# Patient Record
Sex: FEMALE | Race: WHITE | NOT HISPANIC OR LATINO | Employment: PART TIME | URBAN - METROPOLITAN AREA
[De-identification: names, ages, dates, MRNs, and addresses within clinical notes are randomized per-mention and may not be internally consistent; named-entity substitution may affect disease eponyms.]

---

## 2019-05-14 ENCOUNTER — TELEPHONE (OUTPATIENT)
Dept: UROGYNECOLOGY | Facility: CLINIC | Age: 69
End: 2019-05-14

## 2019-05-14 NOTE — TELEPHONE ENCOUNTER
Called and informed that DR Wray is no longer doing WWE , he is strictly prolapse and bladder problems, states understanding and call was ended.

## 2019-05-14 NOTE — TELEPHONE ENCOUNTER
----- Message from Elaine Argueta sent at 5/14/2019  2:03 PM CDT -----  Contact: patient  Type: Needs Medical Advice    Who Called:  patient  Symptoms (please be specific):  A rash  Best Call Back Number: [phone]  Additional Information: calling to schedule a new patient appointment. I was unable to do it. Please give call back

## 2019-07-03 ENCOUNTER — TELEPHONE (OUTPATIENT)
Dept: FAMILY MEDICINE | Facility: CLINIC | Age: 69
End: 2019-07-03

## 2019-07-30 DIAGNOSIS — Z12.39 SCREENING BREAST EXAMINATION: Primary | ICD-10-CM

## 2019-08-06 ENCOUNTER — HOSPITAL ENCOUNTER (OUTPATIENT)
Dept: RADIOLOGY | Facility: HOSPITAL | Age: 69
Discharge: HOME | End: 2019-08-06
Attending: NURSE PRACTITIONER
Payer: MEDICARE

## 2019-08-06 VITALS — WEIGHT: 170 LBS | HEIGHT: 65 IN | BODY MASS INDEX: 28.32 KG/M2

## 2019-08-06 DIAGNOSIS — Z12.39 SCREENING BREAST EXAMINATION: ICD-10-CM

## 2019-08-06 PROCEDURE — 77067 SCR MAMMO BI INCL CAD: CPT | Mod: TC

## 2020-08-18 ENCOUNTER — HOSPITAL ENCOUNTER (OUTPATIENT)
Dept: RADIOLOGY | Facility: HOSPITAL | Age: 70
Discharge: HOME | End: 2020-08-18
Attending: INTERNAL MEDICINE
Payer: MEDICARE

## 2020-08-18 DIAGNOSIS — R06.02 SHORTNESS OF BREATH: ICD-10-CM

## 2020-08-18 DIAGNOSIS — R06.02 SHORTNESS OF BREATH: Primary | ICD-10-CM

## 2020-08-18 PROCEDURE — 71046 X-RAY EXAM CHEST 2 VIEWS: CPT | Mod: TC,PO

## 2021-11-16 ENCOUNTER — DOCUMENTATION ONLY (OUTPATIENT)
Dept: PULMONOLOGY | Facility: CLINIC | Age: 71
End: 2021-11-16
Payer: MEDICARE

## 2021-11-16 DIAGNOSIS — J69.0: Primary | ICD-10-CM

## 2021-11-17 ENCOUNTER — HOSPITAL ENCOUNTER (OUTPATIENT)
Dept: RADIOLOGY | Facility: HOSPITAL | Age: 71
Discharge: HOME | End: 2021-11-17
Attending: INTERNAL MEDICINE
Payer: MEDICARE

## 2021-11-17 ENCOUNTER — HOSPITAL ENCOUNTER (OUTPATIENT)
Dept: PREADMISSION TESTING | Facility: HOSPITAL | Age: 71
Discharge: HOME | End: 2021-11-17
Attending: INTERNAL MEDICINE
Payer: MEDICARE

## 2021-11-17 VITALS
BODY MASS INDEX: 31.22 KG/M2 | HEIGHT: 65 IN | HEART RATE: 58 BPM | WEIGHT: 187.38 LBS | TEMPERATURE: 98 F | RESPIRATION RATE: 16 BRPM | SYSTOLIC BLOOD PRESSURE: 133 MMHG | DIASTOLIC BLOOD PRESSURE: 84 MMHG | OXYGEN SATURATION: 96 %

## 2021-11-17 DIAGNOSIS — Z01.818 PREOP TESTING: Primary | ICD-10-CM

## 2021-11-17 DIAGNOSIS — Z01.818 PREOP TESTING: ICD-10-CM

## 2021-11-17 LAB — SARS-COV-2 RDRP RESP QL NAA+PROBE: NEGATIVE

## 2021-11-17 PROCEDURE — 71046 X-RAY EXAM CHEST 2 VIEWS: CPT | Mod: TC

## 2021-11-17 PROCEDURE — U0002 COVID-19 LAB TEST NON-CDC: HCPCS | Performed by: INTERNAL MEDICINE

## 2021-11-17 PROCEDURE — 93010 ELECTROCARDIOGRAM REPORT: CPT | Mod: ,,, | Performed by: INTERNAL MEDICINE

## 2021-11-17 PROCEDURE — 93005 ELECTROCARDIOGRAM TRACING: CPT | Performed by: INTERNAL MEDICINE

## 2021-11-17 PROCEDURE — 93010 EKG 12-LEAD: ICD-10-PCS | Mod: ,,, | Performed by: INTERNAL MEDICINE

## 2021-11-17 RX ORDER — LEVALBUTEROL INHALATION SOLUTION 1.25 MG/3ML
1 SOLUTION RESPIRATORY (INHALATION) EVERY 4 HOURS PRN
COMMUNITY

## 2021-11-17 RX ORDER — LORATADINE 10 MG/1
10 TABLET ORAL DAILY
COMMUNITY

## 2021-11-17 RX ORDER — OMEPRAZOLE 40 MG/1
40 CAPSULE, DELAYED RELEASE ORAL DAILY
COMMUNITY

## 2021-11-17 RX ORDER — LEVALBUTEROL TARTRATE 45 UG/1
1-2 AEROSOL, METERED ORAL EVERY 4 HOURS PRN
COMMUNITY

## 2021-11-17 RX ORDER — ROFLUMILAST 500 UG/1
500 TABLET ORAL DAILY
COMMUNITY

## 2021-11-17 RX ORDER — FLUTICASONE PROPIONATE 110 UG/1
1 AEROSOL, METERED RESPIRATORY (INHALATION) 2 TIMES DAILY
Status: ON HOLD | COMMUNITY
End: 2021-11-24 | Stop reason: HOSPADM

## 2021-11-17 RX ORDER — FLECAINIDE ACETATE 50 MG/1
50 TABLET ORAL EVERY 12 HOURS
COMMUNITY

## 2021-11-18 ENCOUNTER — ANESTHESIA EVENT (OUTPATIENT)
Dept: SURGERY | Facility: HOSPITAL | Age: 71
End: 2021-11-18
Payer: MEDICARE

## 2021-11-18 ENCOUNTER — HOSPITAL ENCOUNTER (OUTPATIENT)
Facility: HOSPITAL | Age: 71
Discharge: HOME | End: 2021-11-18
Attending: INTERNAL MEDICINE | Admitting: INTERNAL MEDICINE
Payer: MEDICARE

## 2021-11-18 ENCOUNTER — TELEPHONE (OUTPATIENT)
Dept: PULMONOLOGY | Facility: CLINIC | Age: 71
End: 2021-11-18
Payer: MEDICARE

## 2021-11-18 ENCOUNTER — ANESTHESIA (OUTPATIENT)
Dept: SURGERY | Facility: HOSPITAL | Age: 71
End: 2021-11-18
Payer: MEDICARE

## 2021-11-18 VITALS
TEMPERATURE: 97 F | HEART RATE: 64 BPM | RESPIRATION RATE: 17 BRPM | SYSTOLIC BLOOD PRESSURE: 132 MMHG | DIASTOLIC BLOOD PRESSURE: 65 MMHG | OXYGEN SATURATION: 97 %

## 2021-11-18 DIAGNOSIS — R93.89 ABNORMAL CXR: ICD-10-CM

## 2021-11-18 DIAGNOSIS — T17.908D ASPIRATION INTO AIRWAY, SUBSEQUENT ENCOUNTER: Primary | ICD-10-CM

## 2021-11-18 DIAGNOSIS — K22.2 ESOPHAGEAL OBSTRUCTION: ICD-10-CM

## 2021-11-18 PROCEDURE — 25000003 PHARM REV CODE 250: Performed by: NURSE ANESTHETIST, CERTIFIED REGISTERED

## 2021-11-18 PROCEDURE — 43247 EGD REMOVE FOREIGN BODY: CPT | Performed by: INTERNAL MEDICINE

## 2021-11-18 PROCEDURE — 37000009 HC ANESTHESIA EA ADD 15 MINS: Performed by: INTERNAL MEDICINE

## 2021-11-18 PROCEDURE — 27000284 HC CANNULA NASAL: Performed by: NURSE ANESTHETIST, CERTIFIED REGISTERED

## 2021-11-18 PROCEDURE — 27000675 HC TUBING MICRODRIP: Performed by: NURSE ANESTHETIST, CERTIFIED REGISTERED

## 2021-11-18 PROCEDURE — 27202436 HC GRASPER DEVICE, RAT TOOTH: Performed by: INTERNAL MEDICINE

## 2021-11-18 PROCEDURE — 37000008 HC ANESTHESIA 1ST 15 MINUTES: Performed by: INTERNAL MEDICINE

## 2021-11-18 PROCEDURE — 27100019 HC AMBU BAG ADULT/PED: Performed by: NURSE ANESTHETIST, CERTIFIED REGISTERED

## 2021-11-18 PROCEDURE — 27000671 HC TUBING MICROBORE EXT: Performed by: NURSE ANESTHETIST, CERTIFIED REGISTERED

## 2021-11-18 PROCEDURE — 27202103: Performed by: NURSE ANESTHETIST, CERTIFIED REGISTERED

## 2021-11-18 PROCEDURE — 63600175 PHARM REV CODE 636 W HCPCS: Performed by: NURSE ANESTHETIST, CERTIFIED REGISTERED

## 2021-11-18 RX ORDER — PROPOFOL 10 MG/ML
VIAL (ML) INTRAVENOUS
Status: DISCONTINUED | OUTPATIENT
Start: 2021-11-18 | End: 2021-11-18

## 2021-11-18 RX ADMIN — PROPOFOL 50 MG: 10 INJECTION, EMULSION INTRAVENOUS at 09:11

## 2021-11-18 RX ADMIN — SODIUM CHLORIDE: 0.9 INJECTION, SOLUTION INTRAVENOUS at 09:11

## 2021-11-22 ENCOUNTER — HOSPITAL ENCOUNTER (OUTPATIENT)
Dept: RADIOLOGY | Facility: HOSPITAL | Age: 71
Discharge: HOME | End: 2021-11-22
Attending: INTERNAL MEDICINE
Payer: MEDICARE

## 2021-11-22 DIAGNOSIS — J69.0: ICD-10-CM

## 2021-11-22 PROCEDURE — 71250 CT THORAX DX C-: CPT | Mod: TC,PO

## 2021-11-24 ENCOUNTER — ANESTHESIA (OUTPATIENT)
Dept: ENDOSCOPY | Facility: HOSPITAL | Age: 71
End: 2021-11-24
Payer: MEDICARE

## 2021-11-24 ENCOUNTER — HOSPITAL ENCOUNTER (OUTPATIENT)
Facility: HOSPITAL | Age: 71
Discharge: HOME | End: 2021-11-24
Attending: INTERNAL MEDICINE | Admitting: INTERNAL MEDICINE
Payer: MEDICARE

## 2021-11-24 ENCOUNTER — ANESTHESIA EVENT (OUTPATIENT)
Dept: ENDOSCOPY | Facility: HOSPITAL | Age: 71
End: 2021-11-24
Payer: MEDICARE

## 2021-11-24 DIAGNOSIS — R93.89 ABNORMAL CT OF THE CHEST: ICD-10-CM

## 2021-11-24 DIAGNOSIS — J18.9 PNEUMONIA: ICD-10-CM

## 2021-11-24 DIAGNOSIS — J47.0 BRONCHIECTASIS WITH ACUTE LOWER RESPIRATORY INFECTION: Primary | ICD-10-CM

## 2021-11-24 DIAGNOSIS — J18.9 PNEUMONIA OF BOTH LUNGS DUE TO INFECTIOUS ORGANISM, UNSPECIFIED PART OF LUNG: ICD-10-CM

## 2021-11-24 DIAGNOSIS — T17.908A ASPIRATION INTO AIRWAY, INITIAL ENCOUNTER: ICD-10-CM

## 2021-11-24 PROBLEM — J45.50 SEVERE PERSISTENT ASTHMA WITHOUT COMPLICATION: Status: ACTIVE | Noted: 2021-11-24

## 2021-11-24 PROCEDURE — 88112 CYTOPATH CELL ENHANCE TECH: CPT | Mod: 26,,, | Performed by: STUDENT IN AN ORGANIZED HEALTH CARE EDUCATION/TRAINING PROGRAM

## 2021-11-24 PROCEDURE — 87106 FUNGI IDENTIFICATION YEAST: CPT | Performed by: INTERNAL MEDICINE

## 2021-11-24 PROCEDURE — 63600175 PHARM REV CODE 636 W HCPCS: Performed by: NURSE ANESTHETIST, CERTIFIED REGISTERED

## 2021-11-24 PROCEDURE — 25000242 PHARM REV CODE 250 ALT 637 W/ HCPCS: Performed by: INTERNAL MEDICINE

## 2021-11-24 PROCEDURE — 25000003 PHARM REV CODE 250: Performed by: INTERNAL MEDICINE

## 2021-11-24 PROCEDURE — 31625 PR BRONCHOSCOPY,BIOPSY: ICD-10-PCS | Mod: LT,,, | Performed by: INTERNAL MEDICINE

## 2021-11-24 PROCEDURE — D9220A PRA ANESTHESIA: Mod: ANES,,, | Performed by: ANESTHESIOLOGY

## 2021-11-24 PROCEDURE — 88305 TISSUE EXAM BY PATHOLOGIST: CPT | Mod: 26,,, | Performed by: PATHOLOGY

## 2021-11-24 PROCEDURE — 94761 N-INVAS EAR/PLS OXIMETRY MLT: CPT

## 2021-11-24 PROCEDURE — 88305 TISSUE EXAM BY PATHOLOGIST: CPT | Performed by: STUDENT IN AN ORGANIZED HEALTH CARE EDUCATION/TRAINING PROGRAM

## 2021-11-24 PROCEDURE — D9220A PRA ANESTHESIA: ICD-10-PCS | Mod: ANES,,, | Performed by: ANESTHESIOLOGY

## 2021-11-24 PROCEDURE — 87015 SPECIMEN INFECT AGNT CONCNTJ: CPT | Performed by: INTERNAL MEDICINE

## 2021-11-24 PROCEDURE — 88305 TISSUE EXAM BY PATHOLOGIST: ICD-10-PCS | Mod: 26,59,, | Performed by: STUDENT IN AN ORGANIZED HEALTH CARE EDUCATION/TRAINING PROGRAM

## 2021-11-24 PROCEDURE — 31622 DX BRONCHOSCOPE/WASH: CPT | Performed by: INTERNAL MEDICINE

## 2021-11-24 PROCEDURE — D9220A PRA ANESTHESIA: ICD-10-PCS | Mod: CRNA,,, | Performed by: NURSE ANESTHETIST, CERTIFIED REGISTERED

## 2021-11-24 PROCEDURE — 37000009 HC ANESTHESIA EA ADD 15 MINS: Performed by: INTERNAL MEDICINE

## 2021-11-24 PROCEDURE — 88305 TISSUE EXAM BY PATHOLOGIST: CPT | Mod: 26,59,, | Performed by: STUDENT IN AN ORGANIZED HEALTH CARE EDUCATION/TRAINING PROGRAM

## 2021-11-24 PROCEDURE — 87205 SMEAR GRAM STAIN: CPT | Performed by: INTERNAL MEDICINE

## 2021-11-24 PROCEDURE — 88112 PR  CYTOPATH, CELL ENHANCE TECH: ICD-10-PCS | Mod: 26,,, | Performed by: STUDENT IN AN ORGANIZED HEALTH CARE EDUCATION/TRAINING PROGRAM

## 2021-11-24 PROCEDURE — 88305 TISSUE EXAM BY PATHOLOGIST: CPT | Mod: 59 | Performed by: PATHOLOGY

## 2021-11-24 PROCEDURE — 94664 DEMO&/EVAL PT USE INHALER: CPT | Mod: 59

## 2021-11-24 PROCEDURE — 87102 FUNGUS ISOLATION CULTURE: CPT | Performed by: INTERNAL MEDICINE

## 2021-11-24 PROCEDURE — 27200944 HC BRONCH FORCEPS DISPOSABLE: Performed by: INTERNAL MEDICINE

## 2021-11-24 PROCEDURE — 27000646 HC AEROBIKA DEVICE

## 2021-11-24 PROCEDURE — 87116 MYCOBACTERIA CULTURE: CPT | Performed by: INTERNAL MEDICINE

## 2021-11-24 PROCEDURE — 88112 CYTOPATH CELL ENHANCE TECH: CPT | Performed by: STUDENT IN AN ORGANIZED HEALTH CARE EDUCATION/TRAINING PROGRAM

## 2021-11-24 PROCEDURE — 31625 BRONCHOSCOPY W/BIOPSY(S): CPT | Performed by: INTERNAL MEDICINE

## 2021-11-24 PROCEDURE — 25000003 PHARM REV CODE 250

## 2021-11-24 PROCEDURE — 37000008 HC ANESTHESIA 1ST 15 MINUTES: Performed by: INTERNAL MEDICINE

## 2021-11-24 PROCEDURE — 25000003 PHARM REV CODE 250: Performed by: NURSE ANESTHETIST, CERTIFIED REGISTERED

## 2021-11-24 PROCEDURE — 31625 BRONCHOSCOPY W/BIOPSY(S): CPT | Mod: LT,,, | Performed by: INTERNAL MEDICINE

## 2021-11-24 PROCEDURE — 87206 SMEAR FLUORESCENT/ACID STAI: CPT | Performed by: INTERNAL MEDICINE

## 2021-11-24 PROCEDURE — 87070 CULTURE OTHR SPECIMN AEROBIC: CPT | Performed by: INTERNAL MEDICINE

## 2021-11-24 PROCEDURE — 94640 AIRWAY INHALATION TREATMENT: CPT

## 2021-11-24 PROCEDURE — D9220A PRA ANESTHESIA: Mod: CRNA,,, | Performed by: NURSE ANESTHETIST, CERTIFIED REGISTERED

## 2021-11-24 PROCEDURE — 88305 TISSUE EXAM BY PATHOLOGIST: ICD-10-PCS | Mod: 26,,, | Performed by: PATHOLOGY

## 2021-11-24 PROCEDURE — 99900035 HC TECH TIME PER 15 MIN (STAT)

## 2021-11-24 RX ORDER — LIDOCAINE HYDROCHLORIDE 10 MG/ML
INJECTION, SOLUTION EPIDURAL; INFILTRATION; INTRACAUDAL; PERINEURAL
Status: DISCONTINUED
Start: 2021-11-24 | End: 2021-11-24 | Stop reason: HOSPADM

## 2021-11-24 RX ORDER — LEVALBUTEROL INHALATION SOLUTION 0.63 MG/3ML
0.63 SOLUTION RESPIRATORY (INHALATION) ONCE
Status: DISCONTINUED | OUTPATIENT
Start: 2021-11-24 | End: 2021-11-24

## 2021-11-24 RX ORDER — LIDOCAINE HYDROCHLORIDE 20 MG/ML
JELLY TOPICAL
Status: DISCONTINUED
Start: 2021-11-24 | End: 2021-11-24 | Stop reason: HOSPADM

## 2021-11-24 RX ORDER — LIDOCAINE HYDROCHLORIDE 10 MG/ML
INJECTION INFILTRATION; PERINEURAL
Status: COMPLETED | OUTPATIENT
Start: 2021-11-24 | End: 2021-11-24

## 2021-11-24 RX ORDER — IPRATROPIUM BROMIDE AND ALBUTEROL SULFATE 2.5; .5 MG/3ML; MG/3ML
3 SOLUTION RESPIRATORY (INHALATION) ONCE
Status: DISCONTINUED | OUTPATIENT
Start: 2021-11-24 | End: 2021-11-24

## 2021-11-24 RX ORDER — LEVALBUTEROL 1.25 MG/.5ML
1.25 SOLUTION, CONCENTRATE RESPIRATORY (INHALATION) ONCE
Status: COMPLETED | OUTPATIENT
Start: 2021-11-24 | End: 2021-11-24

## 2021-11-24 RX ORDER — PROPOFOL 10 MG/ML
VIAL (ML) INTRAVENOUS
Status: DISCONTINUED | OUTPATIENT
Start: 2021-11-24 | End: 2021-11-24

## 2021-11-24 RX ORDER — METOPROLOL SUCCINATE 25 MG/1
25 TABLET, EXTENDED RELEASE ORAL DAILY
COMMUNITY

## 2021-11-24 RX ORDER — OXYMETAZOLINE HCL 0.05 %
SPRAY, NON-AEROSOL (ML) NASAL
Status: COMPLETED
Start: 2021-11-24 | End: 2021-11-24

## 2021-11-24 RX ORDER — SODIUM CHLORIDE 9 MG/ML
INJECTION, SOLUTION INTRAVENOUS CONTINUOUS
Status: DISCONTINUED | OUTPATIENT
Start: 2021-11-24 | End: 2021-11-24 | Stop reason: HOSPADM

## 2021-11-24 RX ORDER — LIDOCAINE HYDROCHLORIDE 40 MG/ML
4 INJECTION, SOLUTION RETROBULBAR ONCE
Status: COMPLETED | OUTPATIENT
Start: 2021-11-24 | End: 2021-11-24

## 2021-11-24 RX ORDER — LIDOCAINE HYDROCHLORIDE 20 MG/ML
INJECTION, SOLUTION EPIDURAL; INFILTRATION; INTRACAUDAL; PERINEURAL
Status: DISCONTINUED
Start: 2021-11-24 | End: 2021-11-24 | Stop reason: HOSPADM

## 2021-11-24 RX ORDER — LIDOCAINE HYDROCHLORIDE 20 MG/ML
INJECTION INTRAVENOUS
Status: DISCONTINUED | OUTPATIENT
Start: 2021-11-24 | End: 2021-11-24

## 2021-11-24 RX ORDER — PROPOFOL 10 MG/ML
INJECTION, EMULSION INTRAVENOUS
Status: COMPLETED
Start: 2021-11-24 | End: 2021-11-24

## 2021-11-24 RX ORDER — FLUTICASONE FUROATE, UMECLIDINIUM BROMIDE AND VILANTEROL TRIFENATATE 200; 62.5; 25 UG/1; UG/1; UG/1
1 POWDER RESPIRATORY (INHALATION) DAILY
Qty: 180 EACH | Refills: 3 | Status: SHIPPED | OUTPATIENT
Start: 2021-11-24

## 2021-11-24 RX ORDER — LIDOCAINE HYDROCHLORIDE 40 MG/ML
INJECTION, SOLUTION RETROBULBAR
Status: DISCONTINUED
Start: 2021-11-24 | End: 2021-11-24 | Stop reason: HOSPADM

## 2021-11-24 RX ADMIN — LIDOCAINE HYDROCHLORIDE 4 ML: 40 INJECTION, SOLUTION RETROBULBAR; TOPICAL at 07:11

## 2021-11-24 RX ADMIN — PROPOFOL 50 MG: 10 INJECTION, EMULSION INTRAVENOUS at 08:11

## 2021-11-24 RX ADMIN — PROPOFOL 50 MG: 10 INJECTION, EMULSION INTRAVENOUS at 07:11

## 2021-11-24 RX ADMIN — LIDOCAINE HYDROCHLORIDE 10 ML: 10; .005 INJECTION, SOLUTION EPIDURAL; INFILTRATION; INTRACAUDAL; PERINEURAL at 08:11

## 2021-11-24 RX ADMIN — LIDOCAINE HYDROCHLORIDE 100 MG: 20 INJECTION, SOLUTION INTRAVENOUS at 07:11

## 2021-11-24 RX ADMIN — SODIUM CHLORIDE: 0.9 INJECTION, SOLUTION INTRAVENOUS at 07:11

## 2021-11-24 RX ADMIN — LEVALBUTEROL HYDROCHLORIDE 1.25 MG: 1.25 SOLUTION, CONCENTRATE RESPIRATORY (INHALATION) at 07:11

## 2021-11-24 RX ADMIN — PROPOFOL 100 MG: 10 INJECTION, EMULSION INTRAVENOUS at 07:11

## 2021-11-24 RX ADMIN — LIDOCAINE HYDROCHLORIDE 5 ML: 10 INJECTION, SOLUTION INFILTRATION; PERINEURAL at 07:11

## 2021-11-24 RX ADMIN — OXYMETAZOLINE HCL: 0.05 SPRAY NASAL at 07:11

## 2021-11-27 LAB
BACTERIA SPEC AEROBE CULT: NORMAL
BACTERIA SPEC AEROBE CULT: NORMAL
GRAM STN SPEC: NORMAL
GRAM STN SPEC: NORMAL

## 2021-11-29 VITALS
WEIGHT: 187 LBS | SYSTOLIC BLOOD PRESSURE: 132 MMHG | DIASTOLIC BLOOD PRESSURE: 58 MMHG | BODY MASS INDEX: 31.16 KG/M2 | HEIGHT: 65 IN | OXYGEN SATURATION: 96 % | HEART RATE: 64 BPM | TEMPERATURE: 98 F | RESPIRATION RATE: 16 BRPM

## 2021-11-29 LAB
FINAL PATHOLOGIC DIAGNOSIS: NORMAL
Lab: NORMAL

## 2021-11-30 LAB
FINAL PATHOLOGIC DIAGNOSIS: NORMAL
GROSS: NORMAL
Lab: NORMAL

## 2021-12-15 ENCOUNTER — HOSPITAL ENCOUNTER (OUTPATIENT)
Dept: PREADMISSION TESTING | Facility: HOSPITAL | Age: 71
Discharge: HOME | End: 2021-12-15
Attending: INTERNAL MEDICINE
Payer: MEDICARE

## 2021-12-15 VITALS
SYSTOLIC BLOOD PRESSURE: 119 MMHG | DIASTOLIC BLOOD PRESSURE: 74 MMHG | OXYGEN SATURATION: 97 % | BODY MASS INDEX: 30.99 KG/M2 | RESPIRATION RATE: 16 BRPM | HEART RATE: 60 BPM | TEMPERATURE: 97 F | HEIGHT: 65 IN | WEIGHT: 186 LBS

## 2021-12-15 DIAGNOSIS — Z01.818 PRE-OP TESTING: Primary | ICD-10-CM

## 2021-12-15 LAB — SARS-COV-2 RDRP RESP QL NAA+PROBE: NEGATIVE

## 2021-12-15 PROCEDURE — U0002 COVID-19 LAB TEST NON-CDC: HCPCS | Performed by: INTERNAL MEDICINE

## 2021-12-15 RX ORDER — FLUTICASONE PROPIONATE 110 UG/1
1 AEROSOL, METERED RESPIRATORY (INHALATION) 2 TIMES DAILY
COMMUNITY

## 2021-12-16 ENCOUNTER — HOSPITAL ENCOUNTER (OUTPATIENT)
Facility: HOSPITAL | Age: 71
Discharge: HOME | End: 2021-12-16
Attending: INTERNAL MEDICINE | Admitting: INTERNAL MEDICINE
Payer: MEDICARE

## 2021-12-16 ENCOUNTER — ANESTHESIA (OUTPATIENT)
Dept: SURGERY | Facility: HOSPITAL | Age: 71
End: 2021-12-16
Payer: MEDICARE

## 2021-12-16 ENCOUNTER — ANESTHESIA EVENT (OUTPATIENT)
Dept: SURGERY | Facility: HOSPITAL | Age: 71
End: 2021-12-16
Payer: MEDICARE

## 2021-12-16 VITALS
HEART RATE: 54 BPM | TEMPERATURE: 98 F | RESPIRATION RATE: 16 BRPM | DIASTOLIC BLOOD PRESSURE: 63 MMHG | SYSTOLIC BLOOD PRESSURE: 127 MMHG | OXYGEN SATURATION: 97 %

## 2021-12-16 DIAGNOSIS — R13.10 DYSPHAGIA: ICD-10-CM

## 2021-12-16 PROCEDURE — 88305 TISSUE EXAM BY PATHOLOGIST: CPT | Mod: TC,59

## 2021-12-16 PROCEDURE — 27200043 HC FORCEPS, BIOPSY: Performed by: INTERNAL MEDICINE

## 2021-12-16 PROCEDURE — 43249 ESOPH EGD DILATION <30 MM: CPT | Performed by: INTERNAL MEDICINE

## 2021-12-16 PROCEDURE — 37000009 HC ANESTHESIA EA ADD 15 MINS: Performed by: INTERNAL MEDICINE

## 2021-12-16 PROCEDURE — 25000003 PHARM REV CODE 250: Performed by: NURSE ANESTHETIST, CERTIFIED REGISTERED

## 2021-12-16 PROCEDURE — 37000008 HC ANESTHESIA 1ST 15 MINUTES: Performed by: INTERNAL MEDICINE

## 2021-12-16 PROCEDURE — C1726 CATH, BAL DIL, NON-VASCULAR: HCPCS | Performed by: INTERNAL MEDICINE

## 2021-12-16 PROCEDURE — 27000014 HC INFLATION DEVICE: Performed by: INTERNAL MEDICINE

## 2021-12-16 PROCEDURE — 43239 EGD BIOPSY SINGLE/MULTIPLE: CPT | Performed by: INTERNAL MEDICINE

## 2021-12-16 RX ADMIN — SODIUM CHLORIDE: 0.9 INJECTION, SOLUTION INTRAVENOUS at 07:12

## 2021-12-28 LAB — FUNGUS SPEC CULT: ABNORMAL

## 2022-01-12 LAB
ACID FAST MOD KINY STN SPEC: NORMAL
MYCOBACTERIUM SPEC QL CULT: NORMAL

## 2022-01-26 ENCOUNTER — HOSPITAL ENCOUNTER (OUTPATIENT)
Dept: PREADMISSION TESTING | Facility: HOSPITAL | Age: 72
Discharge: HOME | End: 2022-01-26
Attending: INTERNAL MEDICINE
Payer: MEDICARE

## 2022-01-26 VITALS
HEART RATE: 56 BPM | WEIGHT: 177 LBS | RESPIRATION RATE: 16 BRPM | HEIGHT: 65 IN | SYSTOLIC BLOOD PRESSURE: 139 MMHG | DIASTOLIC BLOOD PRESSURE: 84 MMHG | TEMPERATURE: 98 F | BODY MASS INDEX: 29.49 KG/M2

## 2022-01-26 DIAGNOSIS — Z01.818 PREOP TESTING: Primary | ICD-10-CM

## 2022-01-26 LAB — SARS-COV-2 RDRP RESP QL NAA+PROBE: NEGATIVE

## 2022-01-26 PROCEDURE — U0002 COVID-19 LAB TEST NON-CDC: HCPCS | Performed by: INTERNAL MEDICINE

## 2022-01-26 RX ORDER — ACETAMINOPHEN 500 MG
1 TABLET ORAL DAILY
COMMUNITY

## 2022-01-26 RX ORDER — ASCORBIC ACID/MULTIVIT-MIN 1000 MG
1 EFFERVESCENT POWDER IN PACKET ORAL DAILY
COMMUNITY

## 2022-01-26 NOTE — DISCHARGE INSTRUCTIONS
INSTRUCTIONS  To confirm your doctor has scheduled your surgery for: Thursday Jan 27th    COVID TESTING SCHEDULED: today    Morning of surgery please check in with registration near Parking Garage Entrance then proceed to Registration then to endoscopy department    ENDOSCOPY NURSES will call the afternoon prior to procedure with your final arrival time.    TAKE ONLY THESE MEDICATIONS WITH A SMALL SIP OF WATER THE MORNING OF SURGERY: see list    DO NOT TAKE ANY INSULIN OR ORAL DIABETIC MEDICATIONS THE MORNING OF SURGERY UNLESS DIRECTED BY YOUR PHYSICIAN OR PRE ADMIT NURSE.    DO NOT TAKE THESE MEDICATIONS 5-7 DAYS PRIOR to your procedure per your surgeon's request: ASPIRIN, ALEVE, BC powder, CONSTANCE SELTZER, IBUPROFEN, FISH OIL, VITAMIN E, OR HERBALS   (May take Tylenol)    If you are prescribed any types of blood thinners (Aspirin, Coumadin, Plavix, Pradaxa, Xarelto, Aggrenox, Effient, Eliquis, Savasya, Brilinta or any other), please ask your surgeon how many days before scheduled procedure should you stop taking them. You may also need to verify with prescribing physician if it is OK to stop your blood thinners.      INSTRUCTIONS IMPORTANT!!  · Do not eat or drink anything between midnight and the time of your procedure- this includes gum, mints, and candy. You may brush your teeth but do not swallow.  · ONLY if you are diabetic, check your sugar in the morning before your procedure.  · Do not smoke, vape or drink alcoholic beverages 24 hours prior to your procedure.  · If you wear contact lenses, dentures, hearing aids or glasses, bring a container to put them in during surgery and give to a family member for safe keeping.    · Please leave all jewelry, piercing's and valuables at home.   · Wear comfortable loose clothing and rubber soled shoes.  · If your condition changes such as fever, cough, etc, please notify your surgeon.   · ONLY if you have been diagnosed with sleep apnea please bring your C-PAP  machine.  · ONLY if you wear home oxygen please bring your portable oxygen tank the day of your procedure.   · ONLY for patients requiring bowel prep, written instructions will be given by your doctor's office.  · ONLY if you have a neuro stimulator, please bring the controller with you the morning of surgery  · Make arrangements in advance for transportation home by a responsible adult.  · You must make arrangements for transportation, TAXI'S, UBER'S OR LYFTS ARE NOT ALLOWED.        If you have any questions about these instructions, call Endoscopy Nurse at [phone]

## 2022-01-27 ENCOUNTER — HOSPITAL ENCOUNTER (OUTPATIENT)
Facility: HOSPITAL | Age: 72
Discharge: HOME | End: 2022-01-27
Attending: INTERNAL MEDICINE | Admitting: INTERNAL MEDICINE
Payer: MEDICARE

## 2022-01-27 ENCOUNTER — ANESTHESIA (OUTPATIENT)
Dept: SURGERY | Facility: HOSPITAL | Age: 72
End: 2022-01-27
Payer: MEDICARE

## 2022-01-27 ENCOUNTER — ANESTHESIA EVENT (OUTPATIENT)
Dept: SURGERY | Facility: HOSPITAL | Age: 72
End: 2022-01-27
Payer: MEDICARE

## 2022-01-27 VITALS
SYSTOLIC BLOOD PRESSURE: 119 MMHG | OXYGEN SATURATION: 99 % | HEART RATE: 58 BPM | DIASTOLIC BLOOD PRESSURE: 60 MMHG | RESPIRATION RATE: 16 BRPM | TEMPERATURE: 97 F

## 2022-01-27 DIAGNOSIS — R13.10 DYSPHAGIA: ICD-10-CM

## 2022-01-27 PROCEDURE — 27000014 HC INFLATION DEVICE: Performed by: INTERNAL MEDICINE

## 2022-01-27 PROCEDURE — C1726 CATH, BAL DIL, NON-VASCULAR: HCPCS | Performed by: INTERNAL MEDICINE

## 2022-01-27 PROCEDURE — 37000009 HC ANESTHESIA EA ADD 15 MINS: Performed by: INTERNAL MEDICINE

## 2022-01-27 PROCEDURE — 27000671 HC TUBING MICROBORE EXT: Performed by: NURSE ANESTHETIST, CERTIFIED REGISTERED

## 2022-01-27 PROCEDURE — 43249 ESOPH EGD DILATION <30 MM: CPT | Performed by: INTERNAL MEDICINE

## 2022-01-27 PROCEDURE — 37000008 HC ANESTHESIA 1ST 15 MINUTES: Performed by: INTERNAL MEDICINE

## 2022-01-27 PROCEDURE — 63600175 PHARM REV CODE 636 W HCPCS: Performed by: NURSE ANESTHETIST, CERTIFIED REGISTERED

## 2022-01-27 PROCEDURE — 25000003 PHARM REV CODE 250: Performed by: NURSE ANESTHETIST, CERTIFIED REGISTERED

## 2022-01-27 RX ORDER — SODIUM CHLORIDE 9 MG/ML
INJECTION, SOLUTION INTRAVENOUS CONTINUOUS PRN
Status: DISCONTINUED | OUTPATIENT
Start: 2022-01-27 | End: 2022-01-27

## 2022-01-27 RX ORDER — PROPOFOL 10 MG/ML
VIAL (ML) INTRAVENOUS
Status: DISCONTINUED | OUTPATIENT
Start: 2022-01-27 | End: 2022-01-27

## 2022-01-27 RX ADMIN — PROPOFOL 30 MG: 10 INJECTION, EMULSION INTRAVENOUS at 12:01

## 2022-01-27 RX ADMIN — SODIUM CHLORIDE: 900 INJECTION, SOLUTION INTRAVENOUS at 11:01

## 2022-01-27 RX ADMIN — PROPOFOL 40 MG: 10 INJECTION, EMULSION INTRAVENOUS at 12:01

## 2022-01-27 RX ADMIN — PROPOFOL 70 MG: 10 INJECTION, EMULSION INTRAVENOUS at 12:01

## 2022-01-27 NOTE — ANESTHESIA POSTPROCEDURE EVALUATION
Anesthesia Post Evaluation    Patient: Verito Valera    Procedure(s) Performed: Procedure(s) (LRB):  EGD (ESOPHAGOGASTRODUODENOSCOPY) (N/A)    Final Anesthesia Type: MAC      Patient location during evaluation: GI PACU  Patient participation: Yes- Able to Participate  Level of consciousness: awake and alert, oriented and awake  Post-procedure vital signs: reviewed and stable  Pain management: adequate  Airway patency: patent    PONV status at discharge: No PONV  Anesthetic complications: no      Cardiovascular status: blood pressure returned to baseline, hemodynamically stable and stable  Respiratory status: unassisted, spontaneous ventilation and room air  Hydration status: euvolemic  Follow-up not needed.          Vitals Value Taken Time   /60 01/27/22 1245   Temp 36.3 °C (97.4 °F) 01/27/22 1230   Pulse 60 01/27/22 1249   Resp 14 01/27/22 1240   SpO2 99 % 01/27/22 1249   Vitals shown include unvalidated device data.      No case tracking events are documented in the log.      Pain/Avery Score: No data recorded

## 2022-01-27 NOTE — TRANSFER OF CARE
Anesthesia Transfer of Care Note    Patient: Verito Valera    Procedure(s) Performed: Procedure(s) (LRB):  EGD (ESOPHAGOGASTRODUODENOSCOPY) (N/A)    Patient location: GI    Anesthesia Type: MAC    Transport from OR: Transported from OR on 2-3 L/min O2 by NC with adequate spontaneous ventilation    Post pain: adequate analgesia    Post assessment: no apparent anesthetic complications    Post vital signs: stable    Level of consciousness: awake and alert    Nausea/Vomiting: no nausea/vomiting    Complications: none    Transfer of care protocol was followed      Last vitals:   Visit Vitals  BP (!) 124/59 (BP Location: Left arm, Patient Position: Lying)   Pulse (!) 56   Temp 36.1 °C (97 °F) (Oral)   Resp 14   SpO2 98%   Breastfeeding No

## 2022-01-27 NOTE — H&P
GASTROENTEROLOGY PRE-PROCEDURE H&P NOTE  Patient Name: Verito Valera  Patient MRN: [id number]  Patient : 1950    Service date: 2022    PCP: KELLY Sanchez    No chief complaint on file.      HPI: Patient is a 71 y.o. female with PMHx as below here for evaluation of for stricture. Dilation to 10 mm last time.     EGD 21   71 F with h/o severe stricture of esophagus at                          20-22 cm. Prior diagnosis of possible EoE.                          Dilation to 10 mmHg with mucosal fracturing.                          Biopsies obtained to exclude EoE and EGE. If                          eosinophils present will add budesonide to                          treatment regimen and continue ppi with repeat EGD                          and dilation in 3 weeks planned.                          - Benign-appearing esophageal stenosis. Dilated.                          - Normal middle third of esophagus and lower third                          of esophagus. Biopsied.                          - Erythematous mucosa in the stomach. Biopsied.                          - Normal examined duodenum.   Recommendation:        - Patient has a contact .     Past Medical History:  Past Medical History:   Diagnosis Date    A-fib     Arthritis     Asthma     Cancer     Skin Cancer removed c clear borders and resolved     COPD (chronic obstructive pulmonary disease)     Esophageal stricture     Hard of hearing     Pneumonia     Sleep apnea         Past Surgical History:  Past Surgical History:   Procedure Laterality Date    BILATERAL TUBAL LIGATION      BREAST CYST ASPIRATION Right     BRONCHOSCOPY N/A 2021    Procedure: Bronchoscopy 730 am, no floro;  Surgeon: Ady Dejesus MD;  Location: [facility];  Service: Endoscopy;  Laterality: N/A;    CATARACT EXTRACTION, BILATERAL       SECTION      ESOPHAGOGASTRODUODENOSCOPY N/A 2021    Procedure: EGD  (ESOPHAGOGASTRODUODENOSCOPY) WITH DILATION;  Surgeon: Liabn Boyer MD;  Location: [facility];  Service: Endoscopy;  Laterality: N/A;    ESOPHAGOGASTRODUODENOSCOPY N/A 12/16/2021    Procedure: EGD (ESOPHAGOGASTRODUODENOSCOPY);  Surgeon: Liban Boyer MD;  Location: [facility];  Service: Endoscopy;  Laterality: N/A;    ESOPHAGUS FOREIGN BODY REMOVAL  11/18/2021    Part of dental appliance removed from esophagus    EYE SURGERY      HYSTERECTOMY  1986    IMPLANTATION OF BONE ANCHORED HEARING AID (BAHA) Right 2017    PARS PLANA VITRECTOMY W/ REPAIR OF MACULAR HOLE Right     retinal tear Left     SKIN BIOPSY  2020    skin cancer    TONSILLECTOMY  1976    UPPER GASTROINTESTINAL ENDOSCOPY  11/18/2021        Home Medications:  Medications Prior to Admission   Medication Sig Dispense Refill Last Dose    ascorbic acid-multivit-min (EMERGEN-C) 1,000 mg PwEP Take 1 tablet by mouth once daily.   1/26/2022 at Unknown time    cholecalciferol, vitamin D3, (VITAMIN D3) 50 mcg (2,000 unit) Cap Take 1 capsule by mouth once daily.   1/26/2022 at Unknown time    flecainide (TAMBOCOR) 50 MG Tab Take 50 mg by mouth every 12 (twelve) hours.   1/26/2022 at Unknown time    fluticasone propionate (FLOVENT HFA) 110 mcg/actuation inhaler Inhale 1 puff into the lungs 2 (two) times daily. Controller   1/26/2022 at Unknown time    fluticasone-umeclidin-vilanter (TRELEGY ELLIPTA) 200-62.5-25 mcg inhaler Inhale 1 puff into the lungs once daily. 180 each 3 1/26/2022 at Unknown time    levalbuterol (XOPENEX HFA) 45 mcg/actuation inhaler Inhale 1-2 puffs into the lungs every 4 (four) hours as needed for Wheezing. Rescue   1/26/2022 at Unknown time    loratadine (CLARITIN) 10 mg tablet Take 10 mg by mouth once daily.   1/26/2022 at Unknown time    metoprolol succinate (TOPROL-XL) 25 MG 24 hr tablet Take 25 mg by mouth once daily.   1/26/2022 at Unknown time    rivaroxaban (XARELTO ORAL) Take 20 mg by mouth once daily. Last dose  Sunday night   Past Week at Unknown time    roflumilast (DALIRESP) 500 mcg Tab Take 500 mcg by mouth once daily.   1/26/2022 at Unknown time    levalbuterol (XOPENEX) 1.25 mg/3 mL nebulizer solution Take 1 ampule by nebulization every 4 (four) hours as needed for Wheezing. Rescue       omeprazole (PRILOSEC) 40 MG capsule Take 40 mg by mouth once daily.          Inpatient Medications:        Review of patient's allergies indicates:   Allergen Reactions    Sulfa (sulfonamide antibiotics) Rash    Cinnamon analogues Rash       Social History:   Social History     Occupational History    Not on file   Tobacco Use    Smoking status: Never Smoker    Smokeless tobacco: Never Used   Substance and Sexual Activity    Alcohol use: Never    Drug use: Never    Sexual activity: Not on file       Family History:   Family History   Problem Relation Age of Onset    Breast cancer Sister     Breast cancer Paternal Uncle        Review of Systems:  A 10 point review of systems was performed and was normal, except as mentioned in the HPI, including constitutional, HEENT, heme, lymph, cardiovascular, respiratory, gastrointestinal, genitourinary, neurologic, endocrine, psychiatric and musculoskeletal.      OBJECTIVE:    Physical Exam:  24 Hour Vital Sign Ranges: Temp:  [97 °F (36.1 °C)] 97 °F (36.1 °C)  Pulse:  [56] 56  Resp:  [14] 14  SpO2:  [98 %] 98 %  BP: (124)/(59) 124/59  Most recent vitals: BP (!) 124/59 (BP Location: Left arm, Patient Position: Lying)   Pulse (!) 56   Temp 97 °F (36.1 °C) (Oral)   Resp 14   SpO2 98% Comment: ra  Breastfeeding No    GEN: well-developed, well-nourished, awake and alert, non-toxic appearing adult  HEENT: PERRL, sclera anicteric, oral mucosa pink and moist without lesion  NECK: trachea midline; Good ROM  CV: regular rate and rhythm, no murmurs or gallops  RESP: clear to auscultation bilaterally, no wheezes, rhonci or rales  ABD: soft, non-tender, non-distended, normal bowel  sounds  EXT: no swelling or edema, 2+ pulses distally  SKIN: no rashes or jaundice  PSYCH: normal affect    Labs:   Recent Labs     01/26/22  1210   WBC 6.20   MCV 92        Recent Labs     01/26/22  1210      K 4.6      CO2 25   BUN 19   GLU 85     No results for input(s): ALB in the last 72 hours.    Invalid input(s): ALKP, SGOT, SGPT, TBIL, DBIL, TPRO  No results for input(s): PT, INR, PTT in the last 72 hours.      IMPRESSION / RECOMMENDATIONS:  Esophageal stricture  -EGD dilation    RIsks, benefits, alternatives discussed in detail regarding upcoming procedures and sedation. Some of the more common endoscopic complications include but not limited to immediate or delayed perforation, bleeding, infections, pain, inadvertent injury to surrounding tissue / organs and possible need for surgical evaluation. Patient expressed understanding, all questions answered and will proceed with procedure as planned.     Liban Boyer  1/27/2022  11:58 AM

## 2022-01-27 NOTE — ANESTHESIA PREPROCEDURE EVALUATION
01/27/2022  Verito Valera is a 71 y.o., female.    Pre-op Assessment    I have reviewed the Patient Summary Reports.     I have reviewed the Nursing Notes. I have reviewed the NPO Status.   I have reviewed the Medications.     Review of Systems  Anesthesia Hx:  No problems with previous Anesthesia Denies Hx of Anesthetic complications  Neg history of prior surgery. Denies Family Hx of Anesthesia complications.   Denies Personal Hx of Anesthesia complications.   Social:  Non-Smoker, No Alcohol Use    Hematology/Oncology:  Hematology Normal      Hematology Comments: Xarelto therapy  --  Cancer in past history:  surgery  Oncology Comments: Basal cell skin cancer     EENT/Dental:  EENT/Dental Normal [place] Eyes: Visual Impairment Has Bilateral and S/P Extraction - Bilateral Catarract    Cardiovascular:   Hypertension, well controlled Dysrhythmias atrial fibrillation ECG has been reviewed. Patient followed by Dr. Juan Ontiveros last seen 5 months ago   Pulmonary:   COPD, severe Asthma severe Sleep Apnea, CPAP Daily maintenance inhaler therapy   Xopenex rescue therapy used this morning  No home oxygen therapy  No hospitalizations for COPD  Patient followed by , daily maintenance inhaler therapy Jessa arnold 4 months ago    Education provided regarding risk of obstructive sleep apnea     Renal/:  Renal/ Normal     Hepatic/GI:   GERD, well controlled Esophageal stricture  Patient with no active nausea vomiting  Patient with no intestinal obstruction   Musculoskeletal:   Arthritis     Neurological:  Neurology Normal    Endocrine:  Endocrine Normal    Dermatological:  Skin Normal    Psych:  Psychiatric Normal           Patient Active Problem List   Diagnosis    Aspiration into airway    Pneumonia of both lungs due to infectious organism    Severe persistent asthma without complication    Bronchiectasis with acute  lower respiratory infection    Abnormal CT of the chest       Past Surgical History:   Procedure Laterality Date    BILATERAL TUBAL LIGATION      BREAST CYST ASPIRATION Right     BRONCHOSCOPY N/A 2021    Procedure: Bronchoscopy 730 am, no floro;  Surgeon: Ady Dejesus MD;  Location: [facility];  Service: Endoscopy;  Laterality: N/A;    CATARACT EXTRACTION, BILATERAL       SECTION      ESOPHAGOGASTRODUODENOSCOPY N/A 2021    Procedure: EGD (ESOPHAGOGASTRODUODENOSCOPY) WITH DILATION;  Surgeon: Liban Boyer MD;  Location: [facility];  Service: Endoscopy;  Laterality: N/A;    ESOPHAGOGASTRODUODENOSCOPY N/A 2021    Procedure: EGD (ESOPHAGOGASTRODUODENOSCOPY);  Surgeon: Liban Boyer MD;  Location: [facility];  Service: Endoscopy;  Laterality: N/A;    ESOPHAGUS FOREIGN BODY REMOVAL  2021    Part of dental appliance removed from esophagus    EYE SURGERY      HYSTERECTOMY      IMPLANTATION OF BONE ANCHORED HEARING AID (BAHA) Right 2017    PARS PLANA VITRECTOMY W/ REPAIR OF MACULAR HOLE Right     retinal tear Left     SKIN BIOPSY      skin cancer    TONSILLECTOMY      UPPER GASTROINTESTINAL ENDOSCOPY  2021        Tobacco Use:  The patient  reports that she has never smoked. She has never used smokeless tobacco.     Results for orders placed or performed during the hospital encounter of 21   EKG 12-lead    Collection Time: 21 11:33 AM    Narrative    Test Reason : Z01.818,    Vent. Rate : 052 BPM     Atrial Rate : 052 BPM     P-R Int : 170 ms          QRS Dur : 078 ms      QT Int : 392 ms       P-R-T Axes : 077 070 072 degrees     QTc Int : 364 ms    Sinus bradycardia  Otherwise normal ECG  No previous ECGs available  Confirmed by Dustin DA SILVA, Refugio ([id number]) on 2021 9:15:07 AM    Referred By:  ANDREIA           Confirmed By:Refugio Chan MD             Lab Results   Component Value Date    WBC 6.20 2022    HGB 13.8  01/26/2022    HCT 43.7 01/26/2022    MCV 92 01/26/2022     01/26/2022     BMP  Lab Results   Component Value Date     01/26/2022    K 4.6 01/26/2022     01/26/2022    CO2 25 01/26/2022    BUN 19 01/26/2022    CREATININE 0.9 01/26/2022    CALCIUM 9.5 01/26/2022    ANIONGAP 10 01/26/2022    GLU 85 01/26/2022    GLU 89 11/17/2021       No results found for this or any previous visit.          Physical Exam  General:  Well nourished    Airway/Jaw/Neck:  Airway Findings: Mouth Opening: Normal Tongue: Normal  General Airway Assessment: Adult  Mallampati: II  Improves to II with phonation.  TM Distance: Normal, at least 6 cm  Jaw/Neck Findings:  Neck ROM: Normal ROM      Dental:  Dental Findings: Upper Dentures, Lower Dentures   Chest/Lungs:  Chest/Lungs Findings: Clear to auscultation, Normal Respiratory Rate     Heart/Vascular:  Heart Findings: Rate: Normal  Rhythm: Regular Rhythm  Sounds: Normal        Mental Status:  Mental Status Findings:  Cooperative, Alert and Oriented         Anesthesia Plan  Type of Anesthesia, risks & benefits discussed:  Anesthesia Type:  MAC    Patient's Preference:   Plan Factors:          Intra-op Monitoring Plan: standard ASA monitors  Intra-op Monitoring Plan Comments:   Post Op Pain Control Plan: per primary service following discharge from PACU  Post Op Pain Control Plan Comments:     Induction:   IV  Beta Blocker:  Patient is on a Beta-Blocker and has received one dose within the past 24 hours (No further documentation required).       Informed Consent: Patient understands risks and agrees with Anesthesia plan.  Questions answered. Anesthesia consent signed with patient.  ASA Score: 3     Day of Surgery Review of History & Physical:    H&P update referred to the surgeon.     Anesthesia Plan Notes:         MAC with Propofol   POM Mask        Ready For Surgery From Anesthesia Perspective.

## 2022-01-27 NOTE — [employer] PATIENT INSTRUCTIONS
Discharge Summary/Instructions after an Endoscopic Procedure  Patient Name: Verito Valera  Patient MRN: [id number]  Patient YOB: 1950     Thursday, January 27, 2022  Liban Boyer MD  RESTRICTIONS:  During your procedure today, you received medications for sedation.  These   medications may affect your judgment, balance and coordination.  Therefore,   for 24 hours, you have the following restrictions:   - DO NOT drive a car, operate machinery, make legal/financial decisions,   sign important papers or drink alcohol.    ACTIVITY:  Today: no heavy lifting, straining or running due to procedural   sedation/anesthesia.  The following day: return to full activity including work.  DIET:  Eat and drink normally unless instructed otherwise.     TREATMENT FOR COMMON SIDE EFFECTS:  - Mild abdominal pain, nausea, belching, bloating or excessive gas:  rest,   eat lightly and use a heating pad.  - Sore Throat: treat with throat lozenges and/or gargle with warm salt   water.  - Because air was used during the procedure, expelling large amounts of air   from your rectum or belching is normal.  - If a bowel prep was taken, you may not have a bowel movement for 1-3 days.    This is normal.  SYMPTOMS TO WATCH FOR AND REPORT TO YOUR PHYSICIAN:  1. Abdominal pain or bloating, other than gas cramps.  2. Chest pain.  3. Back pain.  4. Signs of infection such as: chills or fever occurring within 24 hours   after the procedure.  5. Rectal bleeding, which would show as bright red, maroon, or black stools.   (A tablespoon of blood from the rectum is not serious, especially if   hemorrhoids are present.)  6. Vomiting.  7. Weakness or dizziness.  GO DIRECTLY TO THE NEAREST EMERGENCY ROOM IF YOU HAVE ANY OF THE FOLLOWING:      Difficulty breathing              Chills and/or fever over 101 F   Persistent vomiting and/or vomiting blood   Severe abdominal pain   Severe chest pain   Black, tarry stools   Bleeding- more than one tablespoon   Any  other symptom or condition that you feel may need urgent attention  Your doctor recommends these additional instructions:  If any biopsies were taken, your doctors clinic will contact you in 1 to 2   weeks with any results.  - Patient has a contact number available for emergencies.  The signs and   symptoms of potential delayed complications were discussed with the   patient.  Return to normal activities tomorrow.  Written discharge   instructions were provided to the patient.   - Continue present medications.   - Continue ppi indefinitely  - Repeat in 4 weeks using 12-15 balloon.    - Discharge patient to home (with escort).  For questions, problems or results please call your physician - Liban Boyer MD at Work:  [phone].  [facility], EMERGENCY ROOM PHONE NUMBER: [phone]  IF A COMPLICATION OR EMERGENCY SITUATION ARISES AND YOU ARE UNABLE TO REACH   YOUR PHYSICIAN - GO DIRECTLY TO THE EMERGENCY ROOM.  MD Liban Pop MD  1/27/2022 12:28:20 PM  This report has been verified and signed electronically.  Dear patient,  As a result of recent federal legislation (The Federal Cures Act), you may   receive lab or pathology results from your procedure in your [facility]   account before your physician is able to contact you. Your physician or   their representative will relay the results to you with their   recommendations at their soonest availability.  Thank you,  [employer]

## 2022-02-09 DIAGNOSIS — J47.9 BRONCHIECTASIS WITHOUT ACUTE EXACERBATION: Primary | ICD-10-CM

## 2022-02-19 ENCOUNTER — OFFICE VISIT (OUTPATIENT)
Dept: URGENT CARE | Facility: CLINIC | Age: 72
End: 2022-02-19
Payer: MEDICARE

## 2022-02-19 VITALS
WEIGHT: 177 LBS | HEART RATE: 70 BPM | DIASTOLIC BLOOD PRESSURE: 81 MMHG | HEIGHT: 65 IN | SYSTOLIC BLOOD PRESSURE: 160 MMHG | BODY MASS INDEX: 29.49 KG/M2 | RESPIRATION RATE: 16 BRPM | TEMPERATURE: 98 F | OXYGEN SATURATION: 95 %

## 2022-02-19 DIAGNOSIS — K02.9 DENTAL CARIES: Primary | ICD-10-CM

## 2022-02-19 PROCEDURE — 1160F PR REVIEW ALL MEDS BY PRESCRIBER/CLIN PHARMACIST DOCUMENTED: ICD-10-PCS | Mod: CPTII,S$GLB,, | Performed by: EMERGENCY MEDICINE

## 2022-02-19 PROCEDURE — 99213 PR OFFICE/OUTPT VISIT, EST, LEVL III, 20-29 MIN: ICD-10-PCS | Mod: S$GLB,,, | Performed by: EMERGENCY MEDICINE

## 2022-02-19 PROCEDURE — 3077F SYST BP >= 140 MM HG: CPT | Mod: CPTII,S$GLB,, | Performed by: EMERGENCY MEDICINE

## 2022-02-19 PROCEDURE — 1159F MED LIST DOCD IN RCRD: CPT | Mod: CPTII,S$GLB,, | Performed by: EMERGENCY MEDICINE

## 2022-02-19 PROCEDURE — 3008F PR BODY MASS INDEX (BMI) DOCUMENTED: ICD-10-PCS | Mod: CPTII,S$GLB,, | Performed by: EMERGENCY MEDICINE

## 2022-02-19 PROCEDURE — 3008F BODY MASS INDEX DOCD: CPT | Mod: CPTII,S$GLB,, | Performed by: EMERGENCY MEDICINE

## 2022-02-19 PROCEDURE — 99213 OFFICE O/P EST LOW 20 MIN: CPT | Mod: S$GLB,,, | Performed by: EMERGENCY MEDICINE

## 2022-02-19 PROCEDURE — 1159F PR MEDICATION LIST DOCUMENTED IN MEDICAL RECORD: ICD-10-PCS | Mod: CPTII,S$GLB,, | Performed by: EMERGENCY MEDICINE

## 2022-02-19 PROCEDURE — 3079F DIAST BP 80-89 MM HG: CPT | Mod: CPTII,S$GLB,, | Performed by: EMERGENCY MEDICINE

## 2022-02-19 PROCEDURE — 3077F PR MOST RECENT SYSTOLIC BLOOD PRESSURE >= 140 MM HG: ICD-10-PCS | Mod: CPTII,S$GLB,, | Performed by: EMERGENCY MEDICINE

## 2022-02-19 PROCEDURE — 3079F PR MOST RECENT DIASTOLIC BLOOD PRESSURE 80-89 MM HG: ICD-10-PCS | Mod: CPTII,S$GLB,, | Performed by: EMERGENCY MEDICINE

## 2022-02-19 PROCEDURE — 1160F RVW MEDS BY RX/DR IN RCRD: CPT | Mod: CPTII,S$GLB,, | Performed by: EMERGENCY MEDICINE

## 2022-02-19 RX ORDER — OXYCODONE AND ACETAMINOPHEN 5; 325 MG/1; MG/1
1 TABLET ORAL EVERY 4 HOURS PRN
Qty: 12 TABLET | Refills: 0 | Status: SHIPPED | OUTPATIENT
Start: 2022-02-19

## 2022-02-19 NOTE — PROGRESS NOTES
"Subjective:       Patient ID: Verito Valera is a 71 y.o. female.    Vitals:  height is 5' 5" (1.651 m) and weight is 80.3 kg (177 lb). Her temperature is 98.3 °F (36.8 °C). Her blood pressure is 160/81 (abnormal) and her pulse is 70. Her respiration is 16 and oxygen saturation is 95%.     Chief Complaint: Dental Pain    Patient does have dental caries and has toothache secondary to that.  Patient started antibiotic and has appointment with dentist on Monday however given toothache presented here.  Denies any other complaints.  Denies trauma.  Patient said it piece of her lower tooth chipped off Kelly's infected    Dental Pain   This is a new problem. The current episode started in the past 7 days. The problem occurs constantly. The problem has been gradually worsening.       Constitution: Negative.   HENT: Positive for dental problem.    Neck: neck negative.   Cardiovascular: Negative.    Eyes: Negative.    Respiratory: Negative.    Gastrointestinal: Negative.    Genitourinary: Negative.    Musculoskeletal: Negative.    Skin: Negative.    Allergic/Immunologic: Negative.    Neurological: Negative.  Negative for headaches.   Hematologic/Lymphatic: Negative.        Objective:      Physical Exam   Constitutional: She is oriented to person, place, and time. She appears well-developed. She is cooperative.  Non-toxic appearance. She does not appear ill. No distress.   HENT:   Head: Normocephalic and atraumatic.   Ears:   Right Ear: Hearing, tympanic membrane, external ear and ear canal normal.   Left Ear: Hearing, tympanic membrane, external ear and ear canal normal.   Nose: Nose normal. No mucosal edema, rhinorrhea or nasal deformity. No epistaxis. Right sinus exhibits no maxillary sinus tenderness and no frontal sinus tenderness. Left sinus exhibits no maxillary sinus tenderness and no frontal sinus tenderness.   Mouth/Throat: Uvula is midline, oropharynx is clear and moist and mucous membranes are normal. No trismus in " the jaw. Normal dentition. No uvula swelling. No posterior oropharyngeal erythema.      Comments: Dental caries of lower insists or teeth and tenderness in that area with slight come swelling.  No airway compromise.  Eyes: Conjunctivae and lids are normal. Right eye exhibits no discharge. Left eye exhibits no discharge. No scleral icterus.   Neck: Trachea normal and phonation normal. Neck supple.   Cardiovascular: Normal rate, regular rhythm, normal heart sounds and normal pulses.   Pulmonary/Chest: Effort normal and breath sounds normal. No respiratory distress.   Abdominal: Normal appearance and bowel sounds are normal. She exhibits no distension and no mass. Soft. There is no abdominal tenderness.   Musculoskeletal: Normal range of motion.         General: No deformity. Normal range of motion.   Neurological: She is alert and oriented to person, place, and time. She exhibits normal muscle tone. Coordination normal.   Skin: Skin is warm, dry, intact, not diaphoretic and not pale.   Psychiatric: Her speech is normal and behavior is normal. Judgment and thought content normal.   Nursing note and vitals reviewed.        Assessment:       1. Dental caries          Plan:         Dental caries    Other orders  -     oxyCODONE-acetaminophen (PERCOCET) 5-325 mg per tablet; Take 1 tablet by mouth every 4 (four) hours as needed for Pain.  Dispense: 12 tablet; Refill: 0         To continue taking antibiotic and use clove oil for comfort locally

## 2022-04-19 ENCOUNTER — HOSPITAL ENCOUNTER (OUTPATIENT)
Dept: RADIOLOGY | Facility: HOSPITAL | Age: 72
Discharge: HOME | End: 2022-04-19
Attending: INTERNAL MEDICINE
Payer: MEDICARE

## 2022-04-19 DIAGNOSIS — J47.9 BRONCHIECTASIS WITHOUT ACUTE EXACERBATION: ICD-10-CM

## 2022-04-19 PROCEDURE — 71250 CT THORAX DX C-: CPT | Mod: TC,PO

## 2022-05-10 DIAGNOSIS — R91.1 COIN LESION: Primary | ICD-10-CM

## 2022-07-06 DIAGNOSIS — J18.1 UNRESOLVED LOBAR PNEUMONIA: Primary | ICD-10-CM

## 2022-08-16 ENCOUNTER — HOSPITAL ENCOUNTER (OUTPATIENT)
Dept: RADIOLOGY | Facility: HOSPITAL | Age: 72
Discharge: HOME | End: 2022-08-16
Attending: INTERNAL MEDICINE
Payer: MEDICARE

## 2022-08-16 DIAGNOSIS — J18.1 UNRESOLVED LOBAR PNEUMONIA: ICD-10-CM

## 2022-08-16 PROCEDURE — 71046 X-RAY EXAM CHEST 2 VIEWS: CPT | Mod: TC,PO

## 2022-08-17 DIAGNOSIS — R06.02 SHORTNESS OF BREATH: Primary | ICD-10-CM

## 2022-08-17 DIAGNOSIS — R91.8 LUNG FIELD ABNORMAL: ICD-10-CM

## 2022-08-18 DIAGNOSIS — J47.9 BRONCHIECTASIS WITHOUT ACUTE EXACERBATION: Primary | ICD-10-CM

## 2022-12-21 ENCOUNTER — HOSPITAL ENCOUNTER (OUTPATIENT)
Dept: RADIOLOGY | Facility: HOSPITAL | Age: 72
Discharge: HOME | End: 2022-12-21
Attending: INTERNAL MEDICINE
Payer: MEDICARE

## 2022-12-21 DIAGNOSIS — R91.8 LUNG FIELD ABNORMAL: ICD-10-CM

## 2022-12-21 DIAGNOSIS — R06.02 SHORTNESS OF BREATH: ICD-10-CM

## 2022-12-21 DIAGNOSIS — J47.9 BRONCHIECTASIS WITHOUT ACUTE EXACERBATION: ICD-10-CM

## 2022-12-21 PROCEDURE — 71250 CT THORAX DX C-: CPT | Mod: TC,PO

## 2022-12-21 PROCEDURE — 71046 X-RAY EXAM CHEST 2 VIEWS: CPT | Mod: TC,PO

## 2024-10-29 ENCOUNTER — OFFICE VISIT (OUTPATIENT)
Dept: PULMONOLOGY | Facility: CLINIC | Age: 74
End: 2024-10-29
Payer: MEDICARE

## 2024-10-29 VITALS
WEIGHT: 181.13 LBS | HEIGHT: 65 IN | HEART RATE: 71 BPM | OXYGEN SATURATION: 95 % | DIASTOLIC BLOOD PRESSURE: 67 MMHG | BODY MASS INDEX: 30.18 KG/M2 | SYSTOLIC BLOOD PRESSURE: 103 MMHG

## 2024-10-29 DIAGNOSIS — J45.50 SEVERE PERSISTENT ASTHMA WITHOUT COMPLICATION: ICD-10-CM

## 2024-10-29 DIAGNOSIS — G47.33 OSA (OBSTRUCTIVE SLEEP APNEA): ICD-10-CM

## 2024-10-29 DIAGNOSIS — J47.9 BRONCHIECTASIS WITHOUT COMPLICATION: Primary | ICD-10-CM

## 2024-10-29 PROBLEM — R93.89 ABNORMAL CT OF THE CHEST: Status: RESOLVED | Noted: 2021-11-24 | Resolved: 2024-10-29

## 2024-10-29 PROBLEM — T17.908A ASPIRATION INTO AIRWAY: Status: RESOLVED | Noted: 2021-11-24 | Resolved: 2024-10-29

## 2024-10-29 PROBLEM — J18.9 PNEUMONIA OF BOTH LUNGS DUE TO INFECTIOUS ORGANISM: Status: RESOLVED | Noted: 2021-11-24 | Resolved: 2024-10-29

## 2024-10-29 PROCEDURE — 1101F PT FALLS ASSESS-DOCD LE1/YR: CPT | Mod: CPTII,S$GLB,, | Performed by: INTERNAL MEDICINE

## 2024-10-29 PROCEDURE — 3074F SYST BP LT 130 MM HG: CPT | Mod: CPTII,S$GLB,, | Performed by: INTERNAL MEDICINE

## 2024-10-29 PROCEDURE — 3288F FALL RISK ASSESSMENT DOCD: CPT | Mod: CPTII,S$GLB,, | Performed by: INTERNAL MEDICINE

## 2024-10-29 PROCEDURE — 99999 PR PBB SHADOW E&M-EST. PATIENT-LVL III: CPT | Mod: PBBFAC,,, | Performed by: INTERNAL MEDICINE

## 2024-10-29 PROCEDURE — 3078F DIAST BP <80 MM HG: CPT | Mod: CPTII,S$GLB,, | Performed by: INTERNAL MEDICINE

## 2024-10-29 PROCEDURE — 1159F MED LIST DOCD IN RCRD: CPT | Mod: CPTII,S$GLB,, | Performed by: INTERNAL MEDICINE

## 2024-10-29 PROCEDURE — 3008F BODY MASS INDEX DOCD: CPT | Mod: CPTII,S$GLB,, | Performed by: INTERNAL MEDICINE

## 2024-10-29 PROCEDURE — 99204 OFFICE O/P NEW MOD 45 MIN: CPT | Mod: S$GLB,,, | Performed by: INTERNAL MEDICINE

## 2024-10-29 RX ORDER — LEVALBUTEROL INHALATION SOLUTION 1.25 MG/3ML
1 SOLUTION RESPIRATORY (INHALATION) EVERY 4 HOURS PRN
Qty: 75 ML | Refills: 11 | Status: SHIPPED | OUTPATIENT
Start: 2024-10-29

## 2024-10-29 RX ORDER — LEVALBUTEROL TARTRATE 45 UG/1
1-2 AEROSOL, METERED ORAL EVERY 4 HOURS PRN
Qty: 15 G | Refills: 11 | Status: SHIPPED | OUTPATIENT
Start: 2024-10-29

## 2024-11-12 ENCOUNTER — HOSPITAL ENCOUNTER (OUTPATIENT)
Dept: PULMONOLOGY | Facility: HOSPITAL | Age: 74
Discharge: HOME | End: 2024-11-12
Attending: INTERNAL MEDICINE
Payer: MEDICARE

## 2024-11-12 DIAGNOSIS — J45.50 SEVERE PERSISTENT ASTHMA WITHOUT COMPLICATION: ICD-10-CM

## 2024-11-12 PROCEDURE — 94060 EVALUATION OF WHEEZING: CPT

## 2024-11-12 PROCEDURE — 94726 PLETHYSMOGRAPHY LUNG VOLUMES: CPT

## 2024-11-12 PROCEDURE — 94729 DIFFUSING CAPACITY: CPT

## 2024-11-12 RX ORDER — ALBUTEROL SULFATE 2.5 MG/.5ML
SOLUTION RESPIRATORY (INHALATION)
Status: DISPENSED
Start: 2024-11-12 | End: 2024-11-12

## 2024-11-13 ENCOUNTER — TELEPHONE (OUTPATIENT)
Dept: PULMONOLOGY | Facility: CLINIC | Age: 74
End: 2024-11-13
Payer: MEDICARE

## 2024-11-13 LAB
DLCO SINGLE BREATH LLN: 15.73
DLCO SINGLE BREATH PRE REF: 53.2 %
DLCO SINGLE BREATH REF: 21.46
DLCOC SBVA LLN: 2.82
DLCOC SBVA REF: 4.2
DLCOC SINGLE BREATH LLN: 15.73
DLCOC SINGLE BREATH REF: 21.46
DLCOVA LLN: 2.82
DLCOVA PRE REF: 90.5 %
DLCOVA PRE: 3.8 ML/(MIN*MMHG*L) (ref 2.82–5.59)
DLCOVA REF: 4.2
ERV LLN: -16449.39
ERV PRE REF: 88.5 %
ERV REF: 0.61
FEF 25 75 CHG: 1.7 %
FEF 25 75 LLN: 1.1
FEF 25 75 POST REF: 18.6 %
FEF 25 75 PRE REF: 18.3 %
FEF 25 75 REF: 2.5
FET100 CHG: -3.2 %
FEV1 CHG: 2.5 %
FEV1 FVC CHG: -1.2 %
FEV1 FVC LLN: 64
FEV1 FVC POST REF: 67.4 %
FEV1 FVC PRE REF: 68.2 %
FEV1 FVC REF: 78
FEV1 LLN: 1.56
FEV1 POST REF: 53 %
FEV1 PRE REF: 51.7 %
FEV1 REF: 2.17
FRCPLETH LLN: 1.95
FRCPLETH PREREF: 110.5 %
FRCPLETH REF: 2.77
FVC CHG: 3.7 %
FVC LLN: 2.04
FVC POST REF: 77.8 %
FVC PRE REF: 75.1 %
FVC REF: 2.83
IVC PRE: 2.53 L (ref 2.04–3.67)
IVC SINGLE BREATH LLN: 2.04
IVC SINGLE BREATH PRE REF: 89.2 %
IVC SINGLE BREATH REF: 2.83
MVV LLN: 71
MVV PRE REF: 56.5 %
MVV REF: 84
PEF CHG: -1.1 %
PEF LLN: 3.8
PEF POST REF: 61.3 %
PEF PRE REF: 62 %
PEF REF: 5.57
POST FEF 25 75: 0.47 L/S (ref 1.1–3.9)
POST FET 100: 7.21 SEC
POST FEV1 FVC: 52.28 % (ref 63.81–89.43)
POST FEV1: 1.15 L (ref 1.56–2.76)
POST FVC: 2.2 L (ref 2.04–3.67)
POST PEF: 3.42 L/S (ref 3.8–7.33)
PRE DLCO: 11.41 ML/(MIN*MMHG) (ref 15.73–27.2)
PRE ERV: 0.54 L (ref -16449.39–16450.61)
PRE FEF 25 75: 0.46 L/S (ref 1.1–3.9)
PRE FET 100: 7.45 SEC
PRE FEV1 FVC: 52.91 % (ref 63.81–89.43)
PRE FEV1: 1.12 L (ref 1.56–2.76)
PRE FRC PL: 3.06 L (ref 1.95–3.59)
PRE FVC: 2.12 L (ref 2.04–3.67)
PRE MVV: 47.4 L/MIN (ref 71.28–96.44)
PRE PEF: 3.45 L/S (ref 3.8–7.33)
PRE RV: 2.52 L (ref 1.58–2.73)
PRE TLC: 4.73 L (ref 4.12–6.09)
RAW LLN: 3.06
RAW PRE REF: 220.4 %
RAW PRE: 6.74 CMH2O*S/L (ref 3.06–3.06)
RAW REF: 3.06
RV LLN: 1.58
RV PRE REF: 116.8 %
RV REF: 2.16
RVTLC LLN: 34
RVTLC PRE REF: 121.7 %
RVTLC PRE: 53.29 % (ref 34.19–53.37)
RVTLC REF: 44
TLC LLN: 4.12
TLC PRE REF: 92.6 %
TLC REF: 5.11
VA PRE: 3 L (ref 4.96–4.96)
VA SINGLE BREATH LLN: 4.96
VA SINGLE BREATH PRE REF: 60.6 %
VA SINGLE BREATH REF: 4.96
VC LLN: 2.04
VC PRE REF: 78 %
VC PRE: 2.21 L (ref 2.04–3.67)
VC REF: 2.83

## 2024-11-13 PROCEDURE — 94726 PLETHYSMOGRAPHY LUNG VOLUMES: CPT | Mod: 26,,, | Performed by: INTERNAL MEDICINE

## 2024-11-13 PROCEDURE — 94060 EVALUATION OF WHEEZING: CPT | Mod: 26,,, | Performed by: INTERNAL MEDICINE

## 2024-11-13 PROCEDURE — 94729 DIFFUSING CAPACITY: CPT | Mod: 26,,, | Performed by: INTERNAL MEDICINE

## 2024-11-13 NOTE — TELEPHONE ENCOUNTER
----- Message from Lisa Mcgowan MD sent at 11/13/2024 11:32 AM CST -----  Ms. Valera  I reviewed your pulmonary function test. It shows moderate COPD. Continue treatment plan as discussed at your last visit.  Dr. Mcgowan

## 2025-02-10 ENCOUNTER — HOSPITAL ENCOUNTER (OUTPATIENT)
Dept: PREADMISSION TESTING | Facility: HOSPITAL | Age: 75
Discharge: HOME | End: 2025-02-10
Attending: INTERNAL MEDICINE
Payer: MEDICARE

## 2025-02-10 VITALS
HEART RATE: 59 BPM | SYSTOLIC BLOOD PRESSURE: 118 MMHG | TEMPERATURE: 98 F | OXYGEN SATURATION: 97 % | DIASTOLIC BLOOD PRESSURE: 76 MMHG | RESPIRATION RATE: 16 BRPM

## 2025-02-10 DIAGNOSIS — Z01.818 PREOP TESTING: Primary | ICD-10-CM

## 2025-02-10 LAB
ANION GAP SERPL CALC-SCNC: 7 MMOL/L (ref 8–16)
BASOPHILS # BLD AUTO: 0.02 K/UL (ref 0–0.2)
BASOPHILS NFR BLD: 0.3 % (ref 0–1.9)
BUN SERPL-MCNC: 21 MG/DL (ref 8–23)
CALCIUM SERPL-MCNC: 9.4 MG/DL (ref 8.7–10.5)
CHLORIDE SERPL-SCNC: 103 MMOL/L (ref 95–110)
CO2 SERPL-SCNC: 28 MMOL/L (ref 23–29)
CREAT SERPL-MCNC: 0.9 MG/DL (ref 0.5–1.4)
DIFFERENTIAL METHOD BLD: ABNORMAL
EOSINOPHIL # BLD AUTO: 0.1 K/UL (ref 0–0.5)
EOSINOPHIL NFR BLD: 1 % (ref 0–8)
ERYTHROCYTE [DISTWIDTH] IN BLOOD BY AUTOMATED COUNT: 13.2 % (ref 11.5–14.5)
EST. GFR  (NO RACE VARIABLE): >60 ML/MIN/1.73 M^2
GLUCOSE SERPL-MCNC: 85 MG/DL (ref 70–110)
HCT VFR BLD AUTO: 41.8 % (ref 37–48.5)
HGB BLD-MCNC: 13.1 G/DL (ref 12–16)
IMM GRANULOCYTES # BLD AUTO: 0.03 K/UL (ref 0–0.04)
IMM GRANULOCYTES NFR BLD AUTO: 0.4 % (ref 0–0.5)
LYMPHOCYTES # BLD AUTO: 1.4 K/UL (ref 1–4.8)
LYMPHOCYTES NFR BLD: 20.4 % (ref 18–48)
MCH RBC QN AUTO: 28.9 PG (ref 27–31)
MCHC RBC AUTO-ENTMCNC: 31.3 G/DL (ref 32–36)
MCV RBC AUTO: 92 FL (ref 82–98)
MONOCYTES # BLD AUTO: 0.4 K/UL (ref 0.3–1)
MONOCYTES NFR BLD: 6.1 % (ref 4–15)
NEUTROPHILS # BLD AUTO: 4.9 K/UL (ref 1.8–7.7)
NEUTROPHILS NFR BLD: 71.8 % (ref 38–73)
NRBC BLD-RTO: 0 /100 WBC
OHS QRS DURATION: 70 MS
OHS QTC CALCULATION: 375 MS
PLATELET # BLD AUTO: 221 K/UL (ref 150–450)
PMV BLD AUTO: 10.3 FL (ref 9.2–12.9)
POTASSIUM SERPL-SCNC: 4.1 MMOL/L (ref 3.5–5.1)
RBC # BLD AUTO: 4.54 M/UL (ref 4–5.4)
SODIUM SERPL-SCNC: 138 MMOL/L (ref 136–145)
WBC # BLD AUTO: 6.76 K/UL (ref 3.9–12.7)

## 2025-02-10 PROCEDURE — 85025 COMPLETE CBC W/AUTO DIFF WBC: CPT | Performed by: INTERNAL MEDICINE

## 2025-02-10 PROCEDURE — 80048 BASIC METABOLIC PNL TOTAL CA: CPT | Performed by: INTERNAL MEDICINE

## 2025-02-10 PROCEDURE — 93005 ELECTROCARDIOGRAM TRACING: CPT | Performed by: INTERNAL MEDICINE

## 2025-02-10 PROCEDURE — 93010 ELECTROCARDIOGRAM REPORT: CPT | Mod: ,,, | Performed by: INTERNAL MEDICINE

## 2025-02-10 RX ORDER — TIRZEPATIDE 2.5 MG/.5ML
2.5 INJECTION, SOLUTION SUBCUTANEOUS
COMMUNITY
Start: 2025-01-09

## 2025-02-10 RX ORDER — AZELASTINE 1 MG/ML
1 SPRAY, METERED NASAL
COMMUNITY
Start: 2025-01-07 | End: 2026-01-07

## 2025-02-10 NOTE — PRE ADMISSION SCREENING
Preadmit assessment complete. Review of patient health history and home medications. Questions answered. Patient/  voiced understanding.  Preop education per AVS

## 2025-02-10 NOTE — DISCHARGE INSTRUCTIONS
To confirm, Your doctor has instructed you that surgery is scheduled for:  Thursday 2/13/25    Endoscopy  will call the afternoon prior to surgery with the final arrival time.      Please report to Outpatient Registration the morning of surgery.     Do not eat anything after midnight the night before your surgery - You may have clear liquids up to 2 hours before coming in for the procedure. This includes water, Gatorade, clear juice     Follow the preop given by the Doctor    YOU MAY BRUSH YOUR TEETH     TAKE ONLY THESE MEDICATIONS WITH A SMALL SIP OF WATER THE MORNING OF YOUR PROCEDURE: see medication list      PLEASE NOTE:  The surgery schedule has many variables which may affect the time of your surgery case.  Family members should be available if your surgery time changes.  Plan to be here the day of your procedure between 2-3 hours.    DO NOT TAKE THESE MEDICATIONS 5-7 DAYS PRIOR to your procedure or per your surgeon's request: ASPIRIN, ALEVE, ADVIL, IBUPROFEN,  CONSTANCE SELTZER, BC , FISH OIL , VITAMIN E, HERBALS  (May take Tylenol)    ONLY if you are prescribed any types of blood thinners such as:  Aspirin, Coumadin, Plavix, Pradaxa, Xarelto, Aggrenox, Effient, Eliquis, Savasya, Brilinta, or any other, ask your surgeon whether you should stop taking them and how long before surgery you should stop.  You may also need to verify with the prescribing physician if it is ok to stop your medication.                                                       IMPORTANT INSTRUCTIONS    Do not smoke, vape or drink alcoholic beverages 24 hours prior to your procedure.  Shower the night before with  Dial antibacterial soap from the neck down.   You may use your own shampoo and face wash. This helps your skin to be as bacteria free as possible.    If you wear contact lenses, dentures, hearing aids or glasses, bring a container to put them in during surgery and give to a family member for safe keeping.    Please leave all jewelry,  piercing's and valuables at home.   Make arrangements in advance for transportation home by a responsible adult.  You must make arrangements for transportation, TAXI'S, UBER'S OR LYFTS ARE NOT ALLOWED.        If you have any questions about these instructions, call the Endoscopy Department at [phone]

## 2025-02-13 ENCOUNTER — HOSPITAL ENCOUNTER (OUTPATIENT)
Facility: HOSPITAL | Age: 75
Discharge: HOME | End: 2025-02-13
Attending: INTERNAL MEDICINE | Admitting: INTERNAL MEDICINE
Payer: MEDICARE

## 2025-02-13 ENCOUNTER — ANESTHESIA (OUTPATIENT)
Dept: SURGERY | Facility: HOSPITAL | Age: 75
End: 2025-02-13
Payer: MEDICARE

## 2025-02-13 ENCOUNTER — ANESTHESIA EVENT (OUTPATIENT)
Dept: SURGERY | Facility: HOSPITAL | Age: 75
End: 2025-02-13
Payer: MEDICARE

## 2025-02-13 VITALS
RESPIRATION RATE: 20 BRPM | HEART RATE: 63 BPM | OXYGEN SATURATION: 98 % | DIASTOLIC BLOOD PRESSURE: 56 MMHG | SYSTOLIC BLOOD PRESSURE: 110 MMHG | TEMPERATURE: 98 F

## 2025-02-13 DIAGNOSIS — K22.2 ESOPHAGEAL STRICTURE: ICD-10-CM

## 2025-02-13 PROCEDURE — 27200043 HC FORCEPS, BIOPSY: Performed by: INTERNAL MEDICINE

## 2025-02-13 PROCEDURE — 37000008 HC ANESTHESIA 1ST 15 MINUTES: Performed by: INTERNAL MEDICINE

## 2025-02-13 PROCEDURE — 88305 TISSUE EXAM BY PATHOLOGIST: CPT | Mod: TC,59 | Performed by: PATHOLOGY

## 2025-02-13 PROCEDURE — 43450 DILATE ESOPHAGUS 1/MULT PASS: CPT | Performed by: INTERNAL MEDICINE

## 2025-02-13 PROCEDURE — 25000003 PHARM REV CODE 250: Performed by: NURSE ANESTHETIST, CERTIFIED REGISTERED

## 2025-02-13 PROCEDURE — 37000009 HC ANESTHESIA EA ADD 15 MINS: Performed by: INTERNAL MEDICINE

## 2025-02-13 PROCEDURE — 63600175 PHARM REV CODE 636 W HCPCS: Performed by: NURSE ANESTHETIST, CERTIFIED REGISTERED

## 2025-02-13 PROCEDURE — 43239 EGD BIOPSY SINGLE/MULTIPLE: CPT | Performed by: INTERNAL MEDICINE

## 2025-02-13 RX ORDER — LIDOCAINE HYDROCHLORIDE 20 MG/ML
INJECTION, SOLUTION EPIDURAL; INFILTRATION; INTRACAUDAL; PERINEURAL
Status: DISCONTINUED | OUTPATIENT
Start: 2025-02-13 | End: 2025-02-13

## 2025-02-13 RX ORDER — PROPOFOL 10 MG/ML
VIAL (ML) INTRAVENOUS
Status: DISCONTINUED | OUTPATIENT
Start: 2025-02-13 | End: 2025-02-13

## 2025-02-13 RX ADMIN — SODIUM CHLORIDE: 0.9 INJECTION, SOLUTION INTRAVENOUS at 11:02

## 2025-02-13 RX ADMIN — PROPOFOL 30 MG: 10 INJECTION, EMULSION INTRAVENOUS at 11:02

## 2025-02-13 RX ADMIN — LIDOCAINE HYDROCHLORIDE 40 MG: 20 INJECTION, SOLUTION INTRAVENOUS at 11:02

## 2025-02-13 RX ADMIN — PROPOFOL 50 MG: 10 INJECTION, EMULSION INTRAVENOUS at 11:02

## 2025-02-13 NOTE — [employer]ATION PATIENT INSTRUCTIONS
Discharge Summary/Instructions after an Endoscopic Procedure  Patient Name: Verito Valera  Patient MRN: [id number]  Patient YOB: 1950     Thursday, February 13, 2025  Liban Boyer MD  RESTRICTIONS:  During your procedure today, you received medications for sedation.  These   medications may affect your judgment, balance and coordination.  Therefore,   for 24 hours, you have the following restrictions:   - DO NOT drive a car, operate machinery, make legal/financial decisions,   sign important papers or drink alcohol.    ACTIVITY:  Today: no heavy lifting, straining or running due to procedural   sedation/anesthesia.  The following day: return to full activity including work.  DIET:  Eat and drink normally unless instructed otherwise.     TREATMENT FOR COMMON SIDE EFFECTS:  - Mild abdominal pain, nausea, belching, bloating or excessive gas:  rest,   eat lightly and use a heating pad.  - Sore Throat: treat with throat lozenges and/or gargle with warm salt   water.  - Because air was used during the procedure, expelling large amounts of air   from your rectum or belching is normal.  - If a bowel prep was taken, you may not have a bowel movement for 1-3 days.    This is normal.  SYMPTOMS TO WATCH FOR AND REPORT TO YOUR PHYSICIAN:  1. Abdominal pain or bloating, other than gas cramps.  2. Chest pain.  3. Back pain.  4. Signs of infection such as: chills or fever occurring within 24 hours   after the procedure.  5. Rectal bleeding, which would show as bright red, maroon, or black stools.   (A tablespoon of blood from the rectum is not serious, especially if   hemorrhoids are present.)  6. Vomiting.  7. Weakness or dizziness.  GO DIRECTLY TO THE NEAREST EMERGENCY ROOM IF YOU HAVE ANY OF THE FOLLOWING:      Difficulty breathing              Chills and/or fever over 101 F   Persistent vomiting and/or vomiting blood   Severe abdominal pain   Severe chest pain   Black, tarry stools   Bleeding- more than one  tablespoon   Any other symptom or condition that you feel may need urgent attention  Your doctor recommends these additional instructions:  If any biopsies were taken, your doctors clinic will contact you in 1 to 2   weeks with any results.  - Patient has a contact number available for emergencies.  The signs and   symptoms of potential delayed complications were discussed with the   patient.  Return to normal activities tomorrow.  Written discharge   instructions were provided to the patient.   - Resume previous diet.   - Continue present medications.   - Await pathology results.   - Repeat upper endoscopy PRN for retreatment.   - Discharge patient to home (with escort).  For questions, problems or results please call your physician - Liban Boyer MD at Work:  [phone].  [facility], EMERGENCY ROOM PHONE NUMBER: [phone]  IF A COMPLICATION OR EMERGENCY SITUATION ARISES AND YOU ARE UNABLE TO REACH   YOUR PHYSICIAN - GO DIRECTLY TO THE EMERGENCY ROOM.  MD Liban Pop MD  2/13/2025 11:31:52 AM  This report has been verified and signed electronically.  Dear patient,  As a result of recent federal legislation (The Federal Cures Act), you may   receive lab or pathology results from your procedure in your [facility]   account before your physician is able to contact you. Your physician or   their representative will relay the results to you with their   recommendations at their soonest availability.  Thank you,  [employer]ATION

## 2025-02-13 NOTE — ANESTHESIA POSTPROCEDURE EVALUATION
Anesthesia Post Evaluation    Patient: Verito Valera    Procedure(s) Performed: Procedure(s) (LRB):  EGD (ESOPHAGOGASTRODUODENOSCOPY) (N/A)    Final Anesthesia Type: general      Patient location during evaluation: GI PACU  Patient participation: Yes- Able to Participate  Level of consciousness: awake and alert  Post-procedure vital signs: reviewed and stable  Pain management: adequate  Airway patency: patent    PONV status at discharge: No PONV  Anesthetic complications: no      Cardiovascular status: hemodynamically stable  Respiratory status: unassisted, spontaneous ventilation and room air  Hydration status: euvolemic  Follow-up not needed.              Vitals Value Taken Time   /60 02/13/25 1200   Temp 36.6 °C (97.8 °F) 02/13/25 1140   Pulse 58 02/13/25 1209   Resp  02/13/25 1240   SpO2 97 % 02/13/25 1209   Vitals shown include unfiled device data.      No case tracking events are documented in the log.      Pain/Avery Score: Avery Score: 10 (2/13/2025 11:46 AM)

## 2025-02-13 NOTE — ANESTHESIA PREPROCEDURE EVALUATION
02/13/2025  Verito Valera is a 74 y.o., female.    Anesthesia Evaluation    I have reviewed the Patient Summary Reports.     I have reviewed the Nursing Notes. I have reviewed the NPO Status.   I have reviewed the Medications.     Review of Systems  Anesthesia Hx:  No problems with previous Anesthesia   Neg history of prior surgery.          Denies Family Hx of Anesthesia complications.    Denies Personal Hx of Anesthesia complications.                    Social:  Non-Smoker, No Alcohol Use       Hematology/Oncology:  Hematology Normal                  Hematology Comments: Xarelto therapy      --  Cancer in past history:              surgery   Oncology Comments: Basal cell skin cancer     EENT/Dental:  EENT/Dental Normal  [place] Eyes: Visual Impairment   Has Bilateral and S/P Extraction - Bilateral Catarract                  Cardiovascular:     Hypertension, well controlled    Dysrhythmias atrial fibrillation         ECG has been reviewed. Patient followed by Dr. Juan Ontiveros last seen 5 months ago                           Pulmonary:   COPD, mild Asthma moderate   Sleep Apnea, CPAP           Education provided regarding risk of obstructive sleep apnea            Renal/:  Renal/ Normal                 Hepatic/GI:     GERD, well controlled   Esophageal stricture  Patient with no active nausea vomiting  Patient with no intestinal obstruction             Musculoskeletal:  Arthritis               Neurological:  Neurology Normal                                      Endocrine:  Endocrine Normal            Dermatological:  Skin Normal    Psych:  Psychiatric Normal                    Patient Active Problem List   Diagnosis    Severe persistent asthma without complication    Bronchiectasis without complication       Past Surgical History:   Procedure Laterality Date    BILATERAL TUBAL LIGATION  1986    BREAST CYST  ASPIRATION Right     BRONCHOSCOPY N/A 2021    Procedure: Bronchoscopy 730 am, no floro;  Surgeon: Ady Dejesus MD;  Location[facility] ENDO;  Service: Endoscopy;  Laterality: N/A;    CATARACT EXTRACTION, BILATERAL       SECTION  1985    ESOPHAGOGASTRODUODENOSCOPY N/A 2021    Procedure: EGD (ESOPHAGOGASTRODUODENOSCOPY) WITH DILATION;  Surgeon: Liban Boyer MD;  Location: [facility] ENDO;  Service: Endoscopy;  Laterality: N/A;    ESOPHAGOGASTRODUODENOSCOPY N/A 2021    Procedure: EGD (ESOPHAGOGASTRODUODENOSCOPY);  Surgeon: Liban Boyer MD;  Location: [facility] ENDO;  Service: Endoscopy;  Laterality: N/A;    ESOPHAGOGASTRODUODENOSCOPY N/A 2022    Procedure: EGD (ESOPHAGOGASTRODUODENOSCOPY);  Surgeon: Liban Boyer MD;  Location: [facility] ENDO;  Service: Endoscopy;  Laterality: N/A;    ESOPHAGUS FOREIGN BODY REMOVAL  2021    Part of dental appliance removed from esophagus    EYE SURGERY      FESS, WITH NASAL SEPTOPLASTY      HYSTERECTOMY      IMPLANTATION OF BONE ANCHORED HEARING AID (BAHA) Right 2017    PARS PLANA VITRECTOMY W/ REPAIR OF MACULAR HOLE Right     retinal tear Left     SKIN BIOPSY      skin cancer    TONSILLECTOMY      UPPER GASTROINTESTINAL ENDOSCOPY  2021        Tobacco Use:  The patient  reports that she has never smoked. She has never used smokeless tobacco.     Results for orders placed or performed during the hospital encounter of 02/10/25   EKG 12-lead    Collection Time: 02/10/25  2:13 PM   Result Value Ref Range    QRS Duration 70 ms    [facility] QTC Calculation 375 ms    Narrative    Test Reason : Z01.818,    Vent. Rate :  54 BPM     Atrial Rate :  54 BPM     P-R Int : 172 ms          QRS Dur :  70 ms      QT Int : 396 ms       P-R-T Axes :  76  39  51 degrees    QTcB Int : 375 ms    Sinus bradycardia  Otherwise normal ECG  Confirmed by Julian Barth ([id number]) on 2/10/2025 5:19:49 PM    Referred By:            Confirmed By: Julian Barth              Lab Results   Component Value Date    WBC 6.76 02/10/2025    HGB 13.1 02/10/2025    HCT 41.8 02/10/2025    MCV 92 02/10/2025     02/10/2025     BMP  Lab Results   Component Value Date     02/10/2025    K 4.1 02/10/2025     02/10/2025    CO2 28 02/10/2025    BUN 21 02/10/2025    CREATININE 0.9 02/10/2025    CALCIUM 9.4 02/10/2025    ANIONGAP 7 (L) 02/10/2025    GLU 85 02/10/2025    GLU 85 01/26/2022    GLU 89 11/17/2021       No results found for this or any previous visit.          Physical Exam  General:  Well nourished, Cooperative, Alert and Oriented       Airway/Jaw/Neck:  Airway Findings: Mouth Opening: Normal     Tongue: Normal      General Airway Assessment: Adult      Mallampati: II  Improves to II with phonation.  TM Distance: Normal, at least 6 cm   Jaw/Neck Findings:     Neck ROM: Normal ROM          Dental:  Dental Findings: Upper Dentures, Lower Dentures      Chest/Lungs:  Chest/Lungs Findings:  Clear to auscultation, Normal Respiratory Rate       Heart/Vascular:  Heart Findings: Rate: Normal  Rhythm: Regular Rhythm  Sounds: Normal                       Mental Status:  Mental Status Findings:  Cooperative, Alert and Oriented         Anesthesia Plan  Type of Anesthesia, risks & benefits discussed:  Anesthesia Type:  Gen Natural Airway    Patient's Preference:   Plan Factors:          Intra-op Monitoring Plan: standard ASA monitors  Intra-op Monitoring Plan Comments:   Post Op Pain Control Plan: per primary service following discharge from PACU  Post Op Pain Control Plan Comments:     Induction:   IV  Beta Blocker:  Patient is on a Beta-Blocker and has received one dose within the past 24 hours (No further documentation required).       Informed Consent: Informed consent signed with the Patient and all parties understand the risks and agree with anesthesia plan.  All questions answered.  Anesthesia consent signed with patient.  ASA Score: 3     Day of Surgery Review of History  & Physical:        Anesthesia Plan Notes:               Ready For Surgery From Anesthesia Perspective.             Physical Exam  General: Well nourished, Cooperative, Alert and Oriented    Airway:  Mallampati: II / II  Mouth Opening: Normal  TM Distance: Normal, at least 6 cm  Tongue: Normal  Neck ROM: Normal ROM    Dental:  Upper Dentures, Lower Dentures    Chest/Lungs:  Clear to auscultation, Normal Respiratory Rate    Heart:  Rate: Normal  Rhythm: Regular Rhythm  Sounds: Normal        Anesthesia Plan  Type of Anesthesia, risks & benefits discussed:    Anesthesia Type: Gen Natural Airway  Intra-op Monitoring Plan: standard ASA monitors  Post Op Pain Control Plan: per primary service following discharge from PACU  Induction:  IV  Informed Consent: Informed consent signed with the Patient and all parties understand the risks and agree with anesthesia plan.  All questions answered.   ASA Score: 3  Anesthesia Plan Notes:           Ready For Surgery From Anesthesia Perspective.     .

## 2025-02-13 NOTE — TRANSFER OF CARE
Anesthesia Transfer of Care Note    Patient: Verito Valera    Procedure(s) Performed: Procedure(s) (LRB):  EGD (ESOPHAGOGASTRODUODENOSCOPY) (N/A)    Patient location: PACU    Anesthesia Type: general    Transport from OR: Transported from OR on room air with adequate spontaneous ventilation    Post pain: adequate analgesia    Post assessment: no apparent anesthetic complications    Post vital signs: stable    Level of consciousness: awake, alert and oriented    Nausea/Vomiting: no nausea/vomiting    Complications: none    Transfer of care protocol was followed    Last vitals: Visit Vitals  /61 (BP Location: Left arm, Patient Position: Sitting)   Pulse 61   Temp 35.9 °C (96.6 °F) (Temporal)   Resp 20   SpO2 97%

## 2025-02-13 NOTE — H&P
GASTROENTEROLOGY PRE-PROCEDURE H&P NOTE  Patient Name: Verito Valera  Patient MRN: [id number]  Patient : 1950    Service date: 2025    PCP: Meredith Lopes FNP    No chief complaint on file.      HPI: Patient is a 74 y.o. female with PMHx as below here for evaluation of h/o esophageal stricture requiring dilations.       Findings:       Three benign-appearing, intrinsic moderate (circumferential scarring        or stenosis; an endoscope may pass) stenoses were found 22 to 26 cm        from the incisors. The stenoses were traversed. A TTS dilator was        passed through the scope. Dilation with a 10-11-12 mm balloon        dilator was performed to 12 mm. The dilation site was examined        following endoscope reinsertion and showed mild mucosal disruption.        Small hiatal hernia and non-obstructive schatzki ring.        The stomach was normal.        The examined duodenum was normal.   Impression:            - Benign-appearing     Past Medical History:  Past Medical History:   Diagnosis Date    A-fib     Arthritis     Asthma     Cancer     Skin Cancer removed c clear borders and resolved     COPD (chronic obstructive pulmonary disease)     Esophageal stricture     GERD (gastroesophageal reflux disease)     Hard of hearing     HLD (hyperlipidemia)     Pneumonia     Sleep apnea         Past Surgical History:  Past Surgical History:   Procedure Laterality Date    BILATERAL TUBAL LIGATION      BREAST CYST ASPIRATION Right     BRONCHOSCOPY N/A 2021    Procedure: Bronchoscopy 730 am, no floro;  Surgeon: Ady Dejesus MD;  Location: [facility];  Service: Endoscopy;  Laterality: N/A;    CATARACT EXTRACTION, BILATERAL       SECTION      ESOPHAGOGASTRODUODENOSCOPY N/A 2021    Procedure: EGD (ESOPHAGOGASTRODUODENOSCOPY) WITH DILATION;  Surgeon: Liban Boyer MD;  Location: [facility];  Service: Endoscopy;  Laterality: N/A;    ESOPHAGOGASTRODUODENOSCOPY N/A 2021     Procedure: EGD (ESOPHAGOGASTRODUODENOSCOPY);  Surgeon: Liban Boyer MD;  Location: [facility] END[facility];  Service: Endoscopy;  Laterality: N/A;    ESOPHAGOGASTRODUODENOSCOPY N/A 01/27/2022    Procedure: EGD (ESOPHAGOGASTRODUODENOSCOPY);  Surgeon: Liban Boyer MD;  Location: [facility] ENDO;  Service: Endoscopy;  Laterality: N/A;    ESOPHAGUS FOREIGN BODY REMOVAL  11/18/2021    Part of dental appliance removed from esophagus    EYE SURGERY      FESS, WITH NASAL SEPTOPLASTY      HYSTERECTOMY  1986    IMPLANTATION OF BONE ANCHORED HEARING AID (BAHA) Right 2017    PARS PLANA VITRECTOMY W/ REPAIR OF MACULAR HOLE Right     retinal tear Left     SKIN BIOPSY  2020    skin cancer    TONSILLECTOMY  1976    UPPER GASTROINTESTINAL ENDOSCOPY  11/18/2021        Home Medications:  Medications Prior to Admission   Medication Sig Dispense Refill Last Dose/Taking    rivaroxaban (XARELTO ORAL) Take 20 mg by mouth once daily. Last dose Luis night   2/13/2025 Morning    azelastine (ASTELIN) 137 mcg (0.1 %) nasal spray 1 spray by Nasal route.       cholecalciferol, vitamin D3, (VITAMIN D3) 50 mcg (2,000 unit) Cap Take 1 capsule by mouth once daily.       flecainide (TAMBOCOR) 50 MG Tab Take 50 mg by mouth every 12 (twelve) hours. 2/10 pt takes 1x day       fluticasone-umeclidin-vilanter (TRELEGY ELLIPTA) 200-62.5-25 mcg inhaler Inhale 1 puff into the lungs once daily. 180 each 3     levalbuterol (XOPENEX HFA) 45 mcg/actuation inhaler Inhale 1-2 puffs into the lungs every 4 (four) hours as needed for Wheezing. Rescue 15 g 11     levalbuterol (XOPENEX) 1.25 mg/3 mL nebulizer solution Take 3 mLs (1.25 mg total) by nebulization every 4 (four) hours as needed for Wheezing. Rescue 75 mL 11     loratadine (CLARITIN) 10 mg tablet Take 10 mg by mouth once daily.       metoprolol succinate (TOPROL-XL) 25 MG 24 hr tablet Take 25 mg by mouth once daily.       multivit-mins no.63/iron/folic (M-VIT ORAL) Take by mouth.       omeprazole (PRILOSEC) 40 MG capsule  Take 40 mg by mouth once daily.       tirzepatide, weight loss, (ZEPBOUND) 2.5 mg/0.5 mL PnIj Inject 2.5 mg into the skin.                  Review of patient's allergies indicates:   Allergen Reactions    Sulfa (sulfonamide antibiotics) Rash    Cinnamon analogues Rash     Mouth rash        Social History:   Social History     Occupational History    Not on file   Tobacco Use    Smoking status: Never    Smokeless tobacco: Never   Substance and Sexual Activity    Alcohol use: Never    Drug use: Never    Sexual activity: Not on file       Family History:   Family History   Problem Relation Name Age of Onset    Breast cancer Sister      Breast cancer Paternal Uncle         Review of Systems:  A 10 point review of systems was performed and was normal, except as mentioned in the HPI, including constitutional, HEENT, heme, lymph, cardiovascular, respiratory, gastrointestinal, genitourinary, neurologic, endocrine, psychiatric and musculoskeletal.      OBJECTIVE:    Physical Exam:  24 Hour Vital Sign Ranges: Temp:  [96.6 °F (35.9 °C)] 96.6 °F (35.9 °C)  Pulse:  [61] 61  Resp:  [20] 20  SpO2:  [97 %] 97 %  BP: (131)/(61) 131/61  Most recent vitals: /61 (BP Location: Left arm, Patient Position: Sitting)   Pulse 61   Temp 96.6 °F (35.9 °C) (Temporal)   Resp 20   SpO2 97%    GEN: well-developed, well-nourished, awake and alert, non-toxic appearing adult  HEENT: PERRL, sclera anicteric, oral mucosa pink and moist without lesion  NECK: trachea midline; Good ROM  CV: regular rate and rhythm, no murmurs or gallops  RESP: clear to auscultation bilaterally, no wheezes, rhonci or rales  ABD: soft, non-tender, non-distended, normal bowel sounds  EXT: no swelling or edema, 2+ pulses distally  SKIN: no rashes or jaundice  PSYCH: normal affect    Labs:   Recent Labs     02/10/25  1444   WBC 6.76   MCV 92        Recent Labs     02/10/25  1444      K 4.1      CO2 28   BUN 21   GLU 85     No results for input(s):  ""ALB" in the last 72 hours.    Invalid input(s): "ALKP", "SGOT", "SGPT", "TBIL", "DBIL", "TPRO"  No results for input(s): "PT", "INR", "PTT" in the last 72 hours.      IMPRESSION / RECOMMENDATIONS:  ***  with interventions as warranted.   RIsks, benefits, alternatives discussed in detail regarding upcoming procedures and sedation. Some of the more common endoscopic complications include but not limited to immediate or delayed perforation, bleeding, infections, pain, inadvertent injury to surrounding tissue / organs and possible need for surgical evaluation. Patient expressed understanding, all questions answered and will proceed with procedure as planned.     Liban Boyer  2/13/2025  11:04 AM      "

## 2025-08-11 ENCOUNTER — TELEPHONE (OUTPATIENT)
Dept: PULMONOLOGY | Facility: CLINIC | Age: 75
End: 2025-08-11
Payer: MEDICARE

## 2025-08-13 DIAGNOSIS — G47.33 OSA (OBSTRUCTIVE SLEEP APNEA): Primary | ICD-10-CM
